# Patient Record
Sex: FEMALE | Race: BLACK OR AFRICAN AMERICAN | NOT HISPANIC OR LATINO | Employment: OTHER | URBAN - METROPOLITAN AREA
[De-identification: names, ages, dates, MRNs, and addresses within clinical notes are randomized per-mention and may not be internally consistent; named-entity substitution may affect disease eponyms.]

---

## 2023-01-28 PROBLEM — I25.10 CAD (CORONARY ARTERY DISEASE): Status: ACTIVE | Noted: 2023-01-28

## 2023-01-28 PROBLEM — I73.9 PERIPHERAL VASCULAR DISEASE (CMS-HCC): Status: ACTIVE | Noted: 2023-01-28

## 2023-01-28 PROBLEM — D64.9 ANEMIA: Status: ACTIVE | Noted: 2023-01-28

## 2023-01-28 PROBLEM — E55.9 VITAMIN D DEFICIENCY: Status: ACTIVE | Noted: 2023-01-28

## 2023-01-28 PROBLEM — M81.0 OSTEOPOROSIS: Status: ACTIVE | Noted: 2023-01-28

## 2023-01-28 PROBLEM — I42.0 ISCHEMIC DILATED CARDIOMYOPATHY (MULTI): Status: ACTIVE | Noted: 2023-01-28

## 2023-01-28 PROBLEM — E11.9 TYPE 2 DIABETES MELLITUS (MULTI): Status: ACTIVE | Noted: 2023-01-28

## 2023-01-28 PROBLEM — R79.89 ELEVATED FERRITIN LEVEL: Status: ACTIVE | Noted: 2023-01-28

## 2023-01-28 PROBLEM — N39.0 ACUTE UTI: Status: ACTIVE | Noted: 2023-01-28

## 2023-01-28 PROBLEM — R92.1 BREAST CALCIFICATION, RIGHT: Status: ACTIVE | Noted: 2023-01-28

## 2023-01-28 PROBLEM — M85.80 OSTEOPENIA: Status: ACTIVE | Noted: 2023-01-28

## 2023-01-28 PROBLEM — D56.1 BETA-THALASSEMIA (MULTI): Status: ACTIVE | Noted: 2023-01-28

## 2023-01-28 PROBLEM — I50.42 CHRONIC COMBINED SYSTOLIC AND DIASTOLIC HEART FAILURE, NYHA CLASS 2 (MULTI): Status: ACTIVE | Noted: 2023-01-28

## 2023-01-28 PROBLEM — E03.9 HYPOTHYROIDISM: Status: ACTIVE | Noted: 2023-01-28

## 2023-01-28 PROBLEM — E11.9 DIABETES MELLITUS (MULTI): Status: ACTIVE | Noted: 2023-01-28

## 2023-01-28 PROBLEM — I10 ESSENTIAL HYPERTENSION: Status: ACTIVE | Noted: 2023-01-28

## 2023-01-28 PROBLEM — R01.1 HEART MURMUR: Status: ACTIVE | Noted: 2023-01-28

## 2023-01-28 PROBLEM — I25.5 ISCHEMIC DILATED CARDIOMYOPATHY (MULTI): Status: ACTIVE | Noted: 2023-01-28

## 2023-01-28 PROBLEM — E78.5 HYPERLIPIDEMIA: Status: ACTIVE | Noted: 2023-01-28

## 2023-01-28 RX ORDER — LEVOTHYROXINE SODIUM 75 UG/1
1 TABLET ORAL EVERY OTHER DAY
COMMUNITY
Start: 2018-04-30 | End: 2023-03-29 | Stop reason: ALTCHOICE

## 2023-01-28 RX ORDER — ROSUVASTATIN CALCIUM 10 MG/1
10 TABLET, COATED ORAL NIGHTLY
COMMUNITY
End: 2023-07-31 | Stop reason: ALTCHOICE

## 2023-01-28 RX ORDER — SPIRONOLACTONE 25 MG/1
0.5 TABLET ORAL 2 TIMES DAILY
COMMUNITY
Start: 2021-11-02 | End: 2024-05-07 | Stop reason: SDUPTHER

## 2023-01-28 RX ORDER — LEVOTHYROXINE SODIUM 88 UG/1
1 TABLET ORAL EVERY OTHER DAY
COMMUNITY
Start: 2018-08-18 | End: 2023-10-19 | Stop reason: SDUPTHER

## 2023-01-28 RX ORDER — OMEGA-3S/DHA/EPA/FISH OIL 300-1000MG
CAPSULE ORAL SEE ADMIN INSTRUCTIONS
COMMUNITY
End: 2023-07-31 | Stop reason: ALTCHOICE

## 2023-01-28 RX ORDER — TORSEMIDE 20 MG/1
1 TABLET ORAL DAILY
COMMUNITY
Start: 2022-01-03 | End: 2023-10-30 | Stop reason: SINTOL

## 2023-01-28 RX ORDER — MULTIVIT-MIN/FA/LYCOPEN/LUTEIN .4-300-25
TABLET ORAL SEE ADMIN INSTRUCTIONS
COMMUNITY
Start: 2014-08-23 | End: 2023-11-02 | Stop reason: ALTCHOICE

## 2023-01-28 RX ORDER — GLUC/MSM/COLGN2/HYAL/ANTIARTH3 375-375-20
TABLET ORAL SEE ADMIN INSTRUCTIONS
COMMUNITY
End: 2023-07-31 | Stop reason: ALTCHOICE

## 2023-01-28 RX ORDER — CHOLECALCIFEROL (VITAMIN D3) 50 MCG
TABLET ORAL SEE ADMIN INSTRUCTIONS
COMMUNITY
Start: 2015-01-03 | End: 2024-06-03 | Stop reason: SDUPTHER

## 2023-01-28 RX ORDER — LISINOPRIL 40 MG/1
1 TABLET ORAL DAILY
COMMUNITY
Start: 2013-06-17 | End: 2023-07-31 | Stop reason: ALTCHOICE

## 2023-01-28 RX ORDER — METFORMIN HYDROCHLORIDE 500 MG/1
2 TABLET, EXTENDED RELEASE ORAL
COMMUNITY
Start: 2015-08-29 | End: 2024-06-03 | Stop reason: SDUPTHER

## 2023-01-28 RX ORDER — BLOOD SUGAR DIAGNOSTIC
STRIP MISCELLANEOUS SEE ADMIN INSTRUCTIONS
COMMUNITY
Start: 2022-04-26 | End: 2023-11-02 | Stop reason: SDUPTHER

## 2023-01-28 RX ORDER — METOPROLOL SUCCINATE 25 MG/1
1 TABLET, EXTENDED RELEASE ORAL DAILY
COMMUNITY
Start: 2013-06-06 | End: 2023-03-19

## 2023-01-28 RX ORDER — ASPIRIN 81 MG/1
1 TABLET ORAL DAILY
COMMUNITY
Start: 2016-12-28 | End: 2024-06-03 | Stop reason: SDUPTHER

## 2023-03-13 ENCOUNTER — APPOINTMENT (OUTPATIENT)
Dept: PRIMARY CARE | Facility: CLINIC | Age: 78
End: 2023-03-13
Payer: MEDICARE

## 2023-03-18 DIAGNOSIS — I25.10 CORONARY ARTERY DISEASE, UNSPECIFIED VESSEL OR LESION TYPE, UNSPECIFIED WHETHER ANGINA PRESENT, UNSPECIFIED WHETHER NATIVE OR TRANSPLANTED HEART: Primary | ICD-10-CM

## 2023-03-19 RX ORDER — METOPROLOL SUCCINATE 25 MG/1
TABLET, EXTENDED RELEASE ORAL
Qty: 90 TABLET | Refills: 0 | Status: SHIPPED | OUTPATIENT
Start: 2023-03-19 | End: 2024-05-07 | Stop reason: SDUPTHER

## 2023-03-29 ENCOUNTER — OFFICE VISIT (OUTPATIENT)
Dept: PRIMARY CARE | Facility: CLINIC | Age: 78
End: 2023-03-29
Payer: MEDICARE

## 2023-03-29 ENCOUNTER — LAB (OUTPATIENT)
Dept: LAB | Facility: LAB | Age: 78
End: 2023-03-29
Payer: MEDICARE

## 2023-03-29 VITALS
TEMPERATURE: 96.6 F | DIASTOLIC BLOOD PRESSURE: 62 MMHG | SYSTOLIC BLOOD PRESSURE: 126 MMHG | WEIGHT: 128 LBS | BODY MASS INDEX: 22.67 KG/M2

## 2023-03-29 DIAGNOSIS — E55.9 VITAMIN D DEFICIENCY: ICD-10-CM

## 2023-03-29 DIAGNOSIS — R01.1 HEART MURMUR: ICD-10-CM

## 2023-03-29 DIAGNOSIS — Z00.00 ROUTINE GENERAL MEDICAL EXAMINATION AT HEALTH CARE FACILITY: Primary | ICD-10-CM

## 2023-03-29 DIAGNOSIS — I10 ESSENTIAL HYPERTENSION: ICD-10-CM

## 2023-03-29 DIAGNOSIS — E11.9 TYPE 2 DIABETES MELLITUS WITHOUT COMPLICATION, WITHOUT LONG-TERM CURRENT USE OF INSULIN (MULTI): ICD-10-CM

## 2023-03-29 DIAGNOSIS — I73.9 PERIPHERAL VASCULAR DISEASE (CMS-HCC): ICD-10-CM

## 2023-03-29 DIAGNOSIS — I50.42 CHRONIC COMBINED SYSTOLIC AND DIASTOLIC HEART FAILURE, NYHA CLASS 2 (MULTI): ICD-10-CM

## 2023-03-29 LAB
ALANINE AMINOTRANSFERASE (SGPT) (U/L) IN SER/PLAS: 10 U/L (ref 7–45)
ALBUMIN (G/DL) IN SER/PLAS: 4.3 G/DL (ref 3.4–5)
ALBUMIN (MG/L) IN URINE: 7.1 MG/L
ALBUMIN/CREATININE (UG/MG) IN URINE: 13.1 UG/MG CRT (ref 0–30)
ALKALINE PHOSPHATASE (U/L) IN SER/PLAS: 73 U/L (ref 33–136)
ANION GAP IN SER/PLAS: 14 MMOL/L (ref 10–20)
ASPARTATE AMINOTRANSFERASE (SGOT) (U/L) IN SER/PLAS: 12 U/L (ref 9–39)
BILIRUBIN TOTAL (MG/DL) IN SER/PLAS: 0.9 MG/DL (ref 0–1.2)
CALCIUM (MG/DL) IN SER/PLAS: 9.7 MG/DL (ref 8.6–10.6)
CARBON DIOXIDE, TOTAL (MMOL/L) IN SER/PLAS: 28 MMOL/L (ref 21–32)
CHLORIDE (MMOL/L) IN SER/PLAS: 103 MMOL/L (ref 98–107)
CHOLESTEROL (MG/DL) IN SER/PLAS: 136 MG/DL (ref 0–199)
CHOLESTEROL IN HDL (MG/DL) IN SER/PLAS: 47.1 MG/DL
CHOLESTEROL/HDL RATIO: 2.9
CREATININE (MG/DL) IN SER/PLAS: 1.23 MG/DL (ref 0.5–1.05)
CREATININE (MG/DL) IN URINE: 54.2 MG/DL (ref 20–320)
ESTIMATED AVERAGE GLUCOSE FOR HBA1C: 217 MG/DL
GFR FEMALE: 45 ML/MIN/1.73M2
GLUCOSE (MG/DL) IN SER/PLAS: 348 MG/DL (ref 74–99)
HEMOGLOBIN A1C/HEMOGLOBIN TOTAL IN BLOOD: 9.2 %
LDL: 54 MG/DL (ref 0–99)
POTASSIUM (MMOL/L) IN SER/PLAS: 4.5 MMOL/L (ref 3.5–5.3)
PROTEIN TOTAL: 7 G/DL (ref 6.4–8.2)
SODIUM (MMOL/L) IN SER/PLAS: 140 MMOL/L (ref 136–145)
TRIGLYCERIDE (MG/DL) IN SER/PLAS: 173 MG/DL (ref 0–149)
UREA NITROGEN (MG/DL) IN SER/PLAS: 23 MG/DL (ref 6–23)
VLDL: 35 MG/DL (ref 0–40)

## 2023-03-29 PROCEDURE — 80061 LIPID PANEL: CPT

## 2023-03-29 PROCEDURE — 80053 COMPREHEN METABOLIC PANEL: CPT

## 2023-03-29 PROCEDURE — 82570 ASSAY OF URINE CREATININE: CPT

## 2023-03-29 PROCEDURE — G0439 PPPS, SUBSEQ VISIT: HCPCS | Performed by: INTERNAL MEDICINE

## 2023-03-29 PROCEDURE — 83036 HEMOGLOBIN GLYCOSYLATED A1C: CPT

## 2023-03-29 PROCEDURE — 3074F SYST BP LT 130 MM HG: CPT | Performed by: INTERNAL MEDICINE

## 2023-03-29 PROCEDURE — 36415 COLL VENOUS BLD VENIPUNCTURE: CPT

## 2023-03-29 PROCEDURE — 3078F DIAST BP <80 MM HG: CPT | Performed by: INTERNAL MEDICINE

## 2023-03-29 PROCEDURE — 99213 OFFICE O/P EST LOW 20 MIN: CPT | Performed by: INTERNAL MEDICINE

## 2023-03-29 PROCEDURE — 82043 UR ALBUMIN QUANTITATIVE: CPT

## 2023-03-29 RX ORDER — DEXTROSE 4 G
TABLET,CHEWABLE ORAL
Qty: 1 EACH | Refills: 0 | Status: SHIPPED | OUTPATIENT
Start: 2023-03-29 | End: 2023-07-31 | Stop reason: ALTCHOICE

## 2023-03-29 ASSESSMENT — PATIENT HEALTH QUESTIONNAIRE - PHQ9
2. FEELING DOWN, DEPRESSED OR HOPELESS: SEVERAL DAYS
SUM OF ALL RESPONSES TO PHQ9 QUESTIONS 1 AND 2: 2
1. LITTLE INTEREST OR PLEASURE IN DOING THINGS: SEVERAL DAYS

## 2023-03-29 ASSESSMENT — ENCOUNTER SYMPTOMS
CONSTITUTIONAL NEGATIVE: 1
RESPIRATORY NEGATIVE: 1
CARDIOVASCULAR NEGATIVE: 1
GASTROINTESTINAL NEGATIVE: 1

## 2023-03-29 NOTE — ASSESSMENT & PLAN NOTE
HTN.  Well controlled.  Continue with current medications.  Check BP at home daily.  Report to us if the numbers are higher than 130/80.

## 2023-03-29 NOTE — PROGRESS NOTES
Subjective   Patient ID: Dana Corrigan is a 78 y.o. female who presents for Follow-up and Annual Exam.  HPI  The patient is being seen for the subsequent annual wellness visit and follow up.  Past Medical, Surgical and Family History: reviewed and updated in chart.   Interval History: Patient has not been hospitalized previously.   Medications and Supplements: Review of all medications by a prescribing practitioner or clinical pharmacist (such as prescriptions, OTCs, herbal therapies and supplements) documented in the medical record.    No, the patient is not using opioids.   Health Risk Assessment:. Paper HRA completed by patient and scanned into chart.   Depression/Suicide Screening:  .   Done  No falls in the past year.  No recent hospitalizations.  Advance care planning completed.      Review of Systems   Constitutional: Negative.    Respiratory: Negative.     Cardiovascular: Negative.    Gastrointestinal: Negative.        Objective   Physical Exam  Constitutional:       Appearance: She is well-developed.   Cardiovascular:      Rate and Rhythm: Normal rate and regular rhythm.      Heart sounds: Murmur heard.   Pulmonary:      Effort: Pulmonary effort is normal.      Breath sounds: Normal breath sounds.   Abdominal:      General: Bowel sounds are normal.      Palpations: Abdomen is soft.         Assessment/Plan   Problem List Items Addressed This Visit          Circulatory    Chronic combined systolic and diastolic heart failure, NYHA class 2 (CMS/HCC)    Essential hypertension        HTN.  Well controlled.  Continue with current medications.  Check BP at home daily.  Report to us if the numbers are higher than 130/80.           Heart murmur    Peripheral vascular disease (CMS/HCC)       Endocrine/Metabolic    Vitamin D deficiency    Type 2 diabetes mellitus (CMS/HCC)        Diabetes Mellitus type II, under good  control.  1. Rx changes none  2. Education: Reviewed ‘ABCs’ of diabetes management (respective goals  in parentheses):  A1C (<7), blood pressure (<130/80), and cholesterol (LDL <100).  3. Compliance at present is estimated to be good. Efforts to improve compliance were discussed.  4. Follow up in 3 months             Relevant Medications    blood-glucose meter misc    Other Relevant Orders    Albumin , Urine Random    Comprehensive Metabolic Panel    Hemoglobin A1C    Lipid Panel     Other Visit Diagnoses       Routine general medical examination at health care facility    -  Primary                 Patience Yarbrough MD

## 2023-03-29 NOTE — ASSESSMENT & PLAN NOTE
Diabetes Mellitus type II, under good  control.  1. Rx changes none  2. Education: Reviewed ‘ABCs’ of diabetes management (respective goals in parentheses):  A1C (<7), blood pressure (<130/80), and cholesterol (LDL <100).  3. Compliance at present is estimated to be good. Efforts to improve compliance were discussed.  4. Follow up in 3 months

## 2023-06-13 DIAGNOSIS — R92.8 ABNORMAL MAMMOGRAM: ICD-10-CM

## 2023-07-07 ENCOUNTER — TELEPHONE (OUTPATIENT)
Dept: PRIMARY CARE | Facility: CLINIC | Age: 78
End: 2023-07-07
Payer: MEDICARE

## 2023-07-07 NOTE — TELEPHONE ENCOUNTER
----- Message from Patience Yarbrough MD sent at 7/7/2023  1:03 AM EDT -----  Repeat mammogram in 6 months

## 2023-07-11 LAB
ALBUMIN (G/DL) IN SER/PLAS: 4.8 G/DL (ref 3.4–5)
ANION GAP IN SER/PLAS: 16 MMOL/L (ref 10–20)
CALCIUM (MG/DL) IN SER/PLAS: 10.5 MG/DL (ref 8.6–10.6)
CARBON DIOXIDE, TOTAL (MMOL/L) IN SER/PLAS: 25 MMOL/L (ref 21–32)
CHLORIDE (MMOL/L) IN SER/PLAS: 100 MMOL/L (ref 98–107)
CHOLESTEROL (MG/DL) IN SER/PLAS: 164 MG/DL (ref 0–199)
CHOLESTEROL IN HDL (MG/DL) IN SER/PLAS: 49.5 MG/DL
CHOLESTEROL/HDL RATIO: 3.3
CREATININE (MG/DL) IN SER/PLAS: 1.56 MG/DL (ref 0.5–1.05)
ERYTHROCYTE DISTRIBUTION WIDTH (RATIO) BY AUTOMATED COUNT: 15.9 % (ref 11.5–14.5)
ERYTHROCYTE MEAN CORPUSCULAR HEMOGLOBIN CONCENTRATION (G/DL) BY AUTOMATED: 29.4 G/DL (ref 32–36)
ERYTHROCYTE MEAN CORPUSCULAR VOLUME (FL) BY AUTOMATED COUNT: 65 FL (ref 80–100)
ERYTHROCYTES (10*6/UL) IN BLOOD BY AUTOMATED COUNT: 5.38 X10E12/L (ref 4–5.2)
FERRITIN (UG/LL) IN SER/PLAS: 1046 UG/L (ref 8–150)
GFR FEMALE: 34 ML/MIN/1.73M2
GLUCOSE (MG/DL) IN SER/PLAS: 252 MG/DL (ref 74–99)
HEMATOCRIT (%) IN BLOOD BY AUTOMATED COUNT: 35 % (ref 36–46)
HEMOGLOBIN (G/DL) IN BLOOD: 10.3 G/DL (ref 12–16)
IRON (UG/DL) IN SER/PLAS: 93 UG/DL (ref 35–150)
IRON BINDING CAPACITY (UG/DL) IN SER/PLAS: 360 UG/DL (ref 240–445)
IRON SATURATION (%) IN SER/PLAS: 26 % (ref 25–45)
LDL: 78 MG/DL (ref 0–99)
LEUKOCYTES (10*3/UL) IN BLOOD BY AUTOMATED COUNT: 7.8 X10E9/L (ref 4.4–11.3)
NATRIURETIC PEPTIDE B (PG/ML) IN SER/PLAS: 29 PG/ML (ref 0–99)
NRBC (PER 100 WBCS) BY AUTOMATED COUNT: 0 /100 WBC (ref 0–0)
PHOSPHATE (MG/DL) IN SER/PLAS: 4.1 MG/DL (ref 2.5–4.9)
PLATELETS (10*3/UL) IN BLOOD AUTOMATED COUNT: 281 X10E9/L (ref 150–450)
POTASSIUM (MMOL/L) IN SER/PLAS: 5 MMOL/L (ref 3.5–5.3)
SODIUM (MMOL/L) IN SER/PLAS: 136 MMOL/L (ref 136–145)
THYROTROPIN (MIU/L) IN SER/PLAS BY DETECTION LIMIT <= 0.05 MIU/L: 0.33 MIU/L (ref 0.44–3.98)
THYROXINE (T4) FREE (NG/DL) IN SER/PLAS: 1.66 NG/DL (ref 0.78–1.48)
TRIGLYCERIDE (MG/DL) IN SER/PLAS: 183 MG/DL (ref 0–149)
UREA NITROGEN (MG/DL) IN SER/PLAS: 44 MG/DL (ref 6–23)
VLDL: 37 MG/DL (ref 0–40)

## 2023-07-31 ENCOUNTER — LAB (OUTPATIENT)
Dept: LAB | Facility: LAB | Age: 78
End: 2023-07-31
Payer: MEDICARE

## 2023-07-31 ENCOUNTER — OFFICE VISIT (OUTPATIENT)
Dept: PRIMARY CARE | Facility: CLINIC | Age: 78
End: 2023-07-31
Payer: MEDICARE

## 2023-07-31 VITALS
SYSTOLIC BLOOD PRESSURE: 127 MMHG | DIASTOLIC BLOOD PRESSURE: 72 MMHG | WEIGHT: 120 LBS | BODY MASS INDEX: 21.26 KG/M2 | HEART RATE: 79 BPM

## 2023-07-31 DIAGNOSIS — I10 ESSENTIAL HYPERTENSION: ICD-10-CM

## 2023-07-31 DIAGNOSIS — E11.9 TYPE 2 DIABETES MELLITUS WITHOUT COMPLICATION, WITHOUT LONG-TERM CURRENT USE OF INSULIN (MULTI): Primary | ICD-10-CM

## 2023-07-31 DIAGNOSIS — E11.9 TYPE 2 DIABETES MELLITUS WITHOUT COMPLICATION, WITHOUT LONG-TERM CURRENT USE OF INSULIN (MULTI): ICD-10-CM

## 2023-07-31 DIAGNOSIS — E03.9 HYPOTHYROIDISM, UNSPECIFIED TYPE: ICD-10-CM

## 2023-07-31 DIAGNOSIS — E78.5 HYPERLIPIDEMIA, UNSPECIFIED HYPERLIPIDEMIA TYPE: ICD-10-CM

## 2023-07-31 DIAGNOSIS — I50.42 CHRONIC COMBINED SYSTOLIC AND DIASTOLIC HEART FAILURE, NYHA CLASS 2 (MULTI): ICD-10-CM

## 2023-07-31 LAB
ESTIMATED AVERAGE GLUCOSE FOR HBA1C: 240 MG/DL
HEMOGLOBIN A1C/HEMOGLOBIN TOTAL IN BLOOD: 10 %
THYROTROPIN (MIU/L) IN SER/PLAS BY DETECTION LIMIT <= 0.05 MIU/L: 0.18 MIU/L (ref 0.44–3.98)
THYROXINE (T4) FREE (NG/DL) IN SER/PLAS: 1.74 NG/DL (ref 0.78–1.48)

## 2023-07-31 PROCEDURE — 84439 ASSAY OF FREE THYROXINE: CPT

## 2023-07-31 PROCEDURE — 99214 OFFICE O/P EST MOD 30 MIN: CPT | Performed by: INTERNAL MEDICINE

## 2023-07-31 PROCEDURE — 36415 COLL VENOUS BLD VENIPUNCTURE: CPT

## 2023-07-31 PROCEDURE — 1159F MED LIST DOCD IN RCRD: CPT | Performed by: INTERNAL MEDICINE

## 2023-07-31 PROCEDURE — 3074F SYST BP LT 130 MM HG: CPT | Performed by: INTERNAL MEDICINE

## 2023-07-31 PROCEDURE — 84443 ASSAY THYROID STIM HORMONE: CPT

## 2023-07-31 PROCEDURE — 83036 HEMOGLOBIN GLYCOSYLATED A1C: CPT

## 2023-07-31 PROCEDURE — 3078F DIAST BP <80 MM HG: CPT | Performed by: INTERNAL MEDICINE

## 2023-07-31 RX ORDER — SACUBITRIL AND VALSARTAN 24; 26 MG/1; MG/1
TABLET, FILM COATED ORAL 2 TIMES DAILY
COMMUNITY
End: 2023-11-02 | Stop reason: ALTCHOICE

## 2023-07-31 NOTE — ASSESSMENT & PLAN NOTE
She was on levothyroxine 75 mcg which was changed to 88 mcg daily and her last visit  Ordered a repeat TSH level today

## 2023-07-31 NOTE — PROGRESS NOTES
I reviewed with the resident the medical history and the resident’s findings on physical examination.  I discussed with the resident the patient’s diagnosis and concur with the treatment plan as documented in the resident note.     I saw and evaluated the patient. I personally obtained the key and critical portions of the history and physical exam or was physically present for key and critical portions performed by the trainee. I reviewed the trainee's documentation and discussed the patient with the trainee. I agree with the trainee's medical decision making, as documented on the trainee's note.     Patience Yarbrough MD

## 2023-07-31 NOTE — ASSESSMENT & PLAN NOTE
Her blood pressure in the office today was 127/72  Well-controlled  On metoprolol succinate 25 mg 1 tab daily  Torsemide 20 mg 1 tab daily

## 2023-07-31 NOTE — ASSESSMENT & PLAN NOTE
Takes Entresto 24-26 1 and half tab twice daily  Jardiance 10 mg daily  Metoprolol succinate 25 mg 1 tab daily  Spironolactone 25 mg 1 tab daily  Torsemide 20 mg 1 tab daily

## 2023-07-31 NOTE — ASSESSMENT & PLAN NOTE
Her most recent A1c on March this year showed increase A1c level from 8.4-9.2  - on Jardiance 10 mg daily  - metformin 500 mg 2 tab once daily  -Repeat hemoglobin A1c ordered

## 2023-07-31 NOTE — PROGRESS NOTES
Subjective   Patient ID: Dana Corrigan is a 78 y.o. female who presents for Follow-up.    HPI   Patient was complaining fullness in the bilateral ears for which she visited to otolaryngologist.  Her otolaryngologist mentioned that she has wax impaction and it should be cleared out.  That is why she is here today as well as also for her regular follow-up.  She denies any ER or hospital visit after her last appointment here.  According to her labs done in March this year showed a little improvement in her creatinine level but her hemoglobin A1c level is worsened from 8.4>9.2, lipid profile showed increased level of all the lipids.  She denied any other complaint today  Review of Systems   HENT:  Positive for hearing loss.    All other systems reviewed and are negative.      Objective   /72   Pulse 79   Wt 54.4 kg (120 lb)   BMI 21.26 kg/m²     Physical Exam  Constitutional:       Appearance: Normal appearance. She is normal weight.   Cardiovascular:      Rate and Rhythm: Normal rate and regular rhythm.      Pulses: Normal pulses.      Heart sounds: Normal heart sounds.      No friction rub. No gallop.   Pulmonary:      Effort: Pulmonary effort is normal.      Breath sounds: Normal breath sounds. No wheezing or rales.   Abdominal:      General: Abdomen is flat. Bowel sounds are normal. There is no distension.      Palpations: Abdomen is soft.      Tenderness: There is no abdominal tenderness. There is no guarding or rebound.   Neurological:      Mental Status: She is alert.         Assessment/Plan   Problem List Items Addressed This Visit          Endocrine/Metabolic    Diabetes mellitus (CMS/HCC) - Primary    Relevant Orders    Hemoglobin A1c    Hypothyroidism    Relevant Orders    Tsh With Reflex To Free T4 If Abnormal   # CHF(combined), NYHA Class 2   - Takes Entresto 24-26 1 and half tab twice daily  - Jardiance 10 mg daily  - Metoprolol succinate 25 mg 1 tab daily  - Spironolactone 25 mg 1 tab daily  -  Torsemide 20 mg 1 tab daily     # HTN  Her blood pressure in the office today was 127/72  Well-controlled  On metoprolol succinate 25 mg 1 tab daily  Torsemide 20 mg 1 tab daily     # HLD  On pravastatin 20 mg daily.    # DM2  Her most recent A1c on March this year showed increase A1c level from 8.4-9.2  - on Jardiance 10 mg daily  - metformin 500 mg 2 tab once daily  -Repeat hemoglobin A1c ordered    # Hypothyroidism  She was on levothyroxine 75 mcg which was changed to 88 mcg daily and her last visit  Last TSH (07/11/23): 0.33, free T4: 1.66  Ordered a repeat TSH level today.

## 2023-10-19 DIAGNOSIS — E03.8 HYPOTHYROIDISM DUE TO HASHIMOTO'S THYROIDITIS: Primary | ICD-10-CM

## 2023-10-19 DIAGNOSIS — E06.3 HYPOTHYROIDISM DUE TO HASHIMOTO'S THYROIDITIS: Primary | ICD-10-CM

## 2023-10-19 RX ORDER — LEVOTHYROXINE SODIUM 88 UG/1
88 TABLET ORAL EVERY OTHER DAY
Qty: 30 TABLET | Refills: 0 | Status: SHIPPED | OUTPATIENT
Start: 2023-10-19 | End: 2023-11-10 | Stop reason: SDUPTHER

## 2023-10-30 ENCOUNTER — LAB (OUTPATIENT)
Dept: LAB | Facility: LAB | Age: 78
End: 2023-10-30
Payer: MEDICARE

## 2023-10-30 ENCOUNTER — OFFICE VISIT (OUTPATIENT)
Dept: PRIMARY CARE | Facility: CLINIC | Age: 78
End: 2023-10-30
Payer: MEDICARE

## 2023-10-30 VITALS
SYSTOLIC BLOOD PRESSURE: 135 MMHG | BODY MASS INDEX: 21.08 KG/M2 | DIASTOLIC BLOOD PRESSURE: 70 MMHG | WEIGHT: 119 LBS | HEART RATE: 95 BPM

## 2023-10-30 DIAGNOSIS — I10 ESSENTIAL HYPERTENSION: ICD-10-CM

## 2023-10-30 DIAGNOSIS — E55.9 VITAMIN D DEFICIENCY: ICD-10-CM

## 2023-10-30 DIAGNOSIS — R01.1 HEART MURMUR: ICD-10-CM

## 2023-10-30 DIAGNOSIS — D64.9 ANEMIA, UNSPECIFIED TYPE: ICD-10-CM

## 2023-10-30 DIAGNOSIS — I50.42 CHRONIC COMBINED SYSTOLIC AND DIASTOLIC HEART FAILURE, NYHA CLASS 2 (MULTI): ICD-10-CM

## 2023-10-30 DIAGNOSIS — E11.9 TYPE 2 DIABETES MELLITUS WITHOUT COMPLICATION, WITHOUT LONG-TERM CURRENT USE OF INSULIN (MULTI): ICD-10-CM

## 2023-10-30 DIAGNOSIS — E78.5 HYPERLIPIDEMIA, UNSPECIFIED HYPERLIPIDEMIA TYPE: ICD-10-CM

## 2023-10-30 DIAGNOSIS — D56.1 BETA-THALASSEMIA (MULTI): ICD-10-CM

## 2023-10-30 DIAGNOSIS — N18.30 STAGE 3 CHRONIC KIDNEY DISEASE, UNSPECIFIED WHETHER STAGE 3A OR 3B CKD (MULTI): ICD-10-CM

## 2023-10-30 DIAGNOSIS — M81.0 OSTEOPOROSIS, UNSPECIFIED OSTEOPOROSIS TYPE, UNSPECIFIED PATHOLOGICAL FRACTURE PRESENCE: ICD-10-CM

## 2023-10-30 DIAGNOSIS — I25.10 CORONARY ARTERY DISEASE, UNSPECIFIED VESSEL OR LESION TYPE, UNSPECIFIED WHETHER ANGINA PRESENT, UNSPECIFIED WHETHER NATIVE OR TRANSPLANTED HEART: Primary | ICD-10-CM

## 2023-10-30 DIAGNOSIS — E03.9 HYPOTHYROIDISM, UNSPECIFIED TYPE: ICD-10-CM

## 2023-10-30 PROBLEM — N39.0 ACUTE UTI: Status: RESOLVED | Noted: 2023-01-28 | Resolved: 2023-10-30

## 2023-10-30 PROBLEM — Z00.00 HEALTHCARE MAINTENANCE: Status: ACTIVE | Noted: 2023-10-30

## 2023-10-30 LAB
ALBUMIN SERPL BCP-MCNC: 4.5 G/DL (ref 3.4–5)
ALP SERPL-CCNC: 77 U/L (ref 33–136)
ALT SERPL W P-5'-P-CCNC: 8 U/L (ref 7–45)
ANION GAP SERPL CALC-SCNC: 18 MMOL/L (ref 10–20)
AST SERPL W P-5'-P-CCNC: 13 U/L (ref 9–39)
BILIRUB SERPL-MCNC: 0.8 MG/DL (ref 0–1.2)
BUN SERPL-MCNC: 17 MG/DL (ref 6–23)
CALCIUM SERPL-MCNC: 10 MG/DL (ref 8.6–10.6)
CHLORIDE SERPL-SCNC: 107 MMOL/L (ref 98–107)
CHOLEST SERPL-MCNC: 221 MG/DL (ref 0–199)
CHOLESTEROL/HDL RATIO: 3.8
CO2 SERPL-SCNC: 23 MMOL/L (ref 21–32)
CREAT SERPL-MCNC: 1.22 MG/DL (ref 0.5–1.05)
CREAT UR-MCNC: 103.1 MG/DL (ref 20–320)
ERYTHROCYTE [DISTWIDTH] IN BLOOD BY AUTOMATED COUNT: 16.9 % (ref 11.5–14.5)
EST. AVERAGE GLUCOSE BLD GHB EST-MCNC: 206 MG/DL
GFR SERPL CREATININE-BSD FRML MDRD: 46 ML/MIN/1.73M*2
GLUCOSE SERPL-MCNC: 216 MG/DL (ref 74–99)
HBA1C MFR BLD: 8.8 %
HCT VFR BLD AUTO: 37.2 % (ref 36–46)
HDLC SERPL-MCNC: 58.1 MG/DL
HGB BLD-MCNC: 10.6 G/DL (ref 12–16)
LDLC SERPL CALC-MCNC: 128 MG/DL
MCH RBC QN AUTO: 19.1 PG (ref 26–34)
MCHC RBC AUTO-ENTMCNC: 28.5 G/DL (ref 32–36)
MCV RBC AUTO: 67 FL (ref 80–100)
MICROALBUMIN UR-MCNC: 15.6 MG/L
MICROALBUMIN/CREAT UR: 15.1 UG/MG CREAT
NON HDL CHOLESTEROL: 163 MG/DL (ref 0–149)
NRBC BLD-RTO: 0 /100 WBCS (ref 0–0)
PLATELET # BLD AUTO: 317 X10*3/UL (ref 150–450)
PMV BLD AUTO: 11.3 FL (ref 7.5–11.5)
POTASSIUM SERPL-SCNC: 4.7 MMOL/L (ref 3.5–5.3)
PROT SERPL-MCNC: 7.8 G/DL (ref 6.4–8.2)
RBC # BLD AUTO: 5.54 X10*6/UL (ref 4–5.2)
SODIUM SERPL-SCNC: 143 MMOL/L (ref 136–145)
TRIGL SERPL-MCNC: 177 MG/DL (ref 0–149)
TSH SERPL-ACNC: 3.37 MIU/L (ref 0.44–3.98)
VLDL: 35 MG/DL (ref 0–40)
WBC # BLD AUTO: 5.6 X10*3/UL (ref 4.4–11.3)

## 2023-10-30 PROCEDURE — 85027 COMPLETE CBC AUTOMATED: CPT

## 2023-10-30 PROCEDURE — 82043 UR ALBUMIN QUANTITATIVE: CPT

## 2023-10-30 PROCEDURE — 80061 LIPID PANEL: CPT

## 2023-10-30 PROCEDURE — 82570 ASSAY OF URINE CREATININE: CPT

## 2023-10-30 PROCEDURE — 80053 COMPREHEN METABOLIC PANEL: CPT

## 2023-10-30 PROCEDURE — 36415 COLL VENOUS BLD VENIPUNCTURE: CPT

## 2023-10-30 PROCEDURE — 83036 HEMOGLOBIN GLYCOSYLATED A1C: CPT

## 2023-10-30 PROCEDURE — 82652 VIT D 1 25-DIHYDROXY: CPT

## 2023-10-30 PROCEDURE — 84443 ASSAY THYROID STIM HORMONE: CPT

## 2023-10-30 PROCEDURE — 99214 OFFICE O/P EST MOD 30 MIN: CPT | Performed by: INTERNAL MEDICINE

## 2023-10-30 PROCEDURE — 1159F MED LIST DOCD IN RCRD: CPT | Performed by: INTERNAL MEDICINE

## 2023-10-30 PROCEDURE — 1126F AMNT PAIN NOTED NONE PRSNT: CPT | Performed by: INTERNAL MEDICINE

## 2023-10-30 PROCEDURE — 3075F SYST BP GE 130 - 139MM HG: CPT | Performed by: INTERNAL MEDICINE

## 2023-10-30 PROCEDURE — 1036F TOBACCO NON-USER: CPT | Performed by: INTERNAL MEDICINE

## 2023-10-30 PROCEDURE — 3078F DIAST BP <80 MM HG: CPT | Performed by: INTERNAL MEDICINE

## 2023-10-30 RX ORDER — DEXTROSE 4 G
1 TABLET,CHEWABLE ORAL 3 TIMES DAILY
Qty: 1 EACH | Refills: 0 | Status: SHIPPED | OUTPATIENT
Start: 2023-10-30

## 2023-10-30 RX ORDER — ALENDRONATE SODIUM 70 MG/1
70 TABLET ORAL
Qty: 4 TABLET | Refills: 2 | Status: SHIPPED | OUTPATIENT
Start: 2023-10-30 | End: 2023-11-22

## 2023-10-30 RX ORDER — BLOOD-GLUCOSE CONTROL, NORMAL
1 EACH MISCELLANEOUS 3 TIMES DAILY
Qty: 30 EACH | Refills: 2 | Status: SHIPPED | OUTPATIENT
Start: 2023-10-30 | End: 2024-06-03 | Stop reason: SDUPTHER

## 2023-10-30 RX ORDER — PRAVASTATIN SODIUM 20 MG/1
40 TABLET ORAL NIGHTLY
COMMUNITY
End: 2024-02-12 | Stop reason: SDUPTHER

## 2023-10-30 RX ORDER — BLOOD SUGAR DIAGNOSTIC
1 STRIP MISCELLANEOUS 3 TIMES DAILY
Qty: 30 STRIP | Refills: 2 | Status: SHIPPED | OUTPATIENT
Start: 2023-10-30 | End: 2024-04-06

## 2023-10-30 ASSESSMENT — ENCOUNTER SYMPTOMS
NUMBNESS: 0
DIZZINESS: 0
HEMATURIA: 0
DIFFICULTY URINATING: 0
FEVER: 0
LIGHT-HEADEDNESS: 0
ABDOMINAL PAIN: 0
FLANK PAIN: 0
VOMITING: 0
PALPITATIONS: 0
CHILLS: 0
SHORTNESS OF BREATH: 0
WHEEZING: 0
NAUSEA: 0
DYSURIA: 0
COLOR CHANGE: 0
CONFUSION: 0
FREQUENCY: 0
MYALGIAS: 0
ABDOMINAL DISTENTION: 0
COUGH: 0
DIARRHEA: 0

## 2023-10-30 ASSESSMENT — PATIENT HEALTH QUESTIONNAIRE - PHQ9
SUM OF ALL RESPONSES TO PHQ9 QUESTIONS 1 AND 2: 2
1. LITTLE INTEREST OR PLEASURE IN DOING THINGS: SEVERAL DAYS
2. FEELING DOWN, DEPRESSED OR HOPELESS: SEVERAL DAYS

## 2023-10-30 NOTE — PROGRESS NOTES
I saw and evaluated the patient. I personally obtained the key and critical portions of the history and physical exam or was physically present for key and critical portions performed by the resident/fellow. I reviewed the resident/fellow's documentation and discussed the patient with the resident/fellow. I agree with the resident/fellow's medical decision making as documented in the note.    Patience Yarbrough MD

## 2023-10-30 NOTE — PROGRESS NOTES
Subjective      History Of Present Illness:  Dana Corrigan is a 78 y.o. female with a PMH of HTN, HLD, DM2, hypothyroidism, osteoporosis, anemia, CAD, systolic-diastolic HF EF 30-35%, who presents to [facility] for follow up appointment. Patient reports that she was recently started on Jardiance and cholesterol rx she does not remember by her cardiologist that she is scheduled to see this week. ECHO and DEXA scans were also done this year per patient. Her daughter states that patient is eating sweets, which could be why her A1c has been increasing for the past 3 readings.     Past Medical History:  She has a past medical history of Body mass index (BMI) 19.9 or less, adult, Body mass index (BMI) 20.0-20.9, adult (04/15/2022), and Encounter for screening mammogram for malignant neoplasm of breast.    Past Surgical History:  She has a past surgical history that includes Total vaginal hysterectomy (06/16/2013) and Colonoscopy (02/22/2013).     Social History:  She reports that she has quit smoking. Her smoking use included cigarettes. She has never used smokeless tobacco. She reports that she does not currently use alcohol. She reports that she does not use drugs.    Family History:  Family History   Problem Relation Name Age of Onset    Hypertension Mother      Sarcoidosis Sister      Breast cancer Other      Colon cancer Other      Diabetes Other       Allergies:  Patient has no known allergies.    Home Medications:  (Not in a hospital admission)    Review Of Systems:  11-point ROS was performed and is negative except as noted below and in the HPI.     Review of Systems   Constitutional:  Negative for chills and fever.   Eyes:  Negative for visual disturbance.   Respiratory:  Negative for cough, shortness of breath and wheezing.    Cardiovascular:  Negative for chest pain, palpitations and leg swelling.   Gastrointestinal:  Negative for abdominal distention, abdominal pain, diarrhea, nausea and vomiting.    Genitourinary:  Negative for difficulty urinating, dysuria, flank pain, frequency and hematuria.   Musculoskeletal:  Negative for myalgias.   Skin:  Negative for color change.   Neurological:  Negative for dizziness, light-headedness and numbness.   Psychiatric/Behavioral:  Negative for confusion.         Objective      /70   Pulse 95   Wt 54 kg (119 lb)   BMI 21.08 kg/m²     Physical Exam  Constitutional:       General: She is not in acute distress.     Appearance: Normal appearance.   HENT:      Head: Normocephalic and atraumatic.      Mouth/Throat:      Mouth: Mucous membranes are moist.   Eyes:      Conjunctiva/sclera: Conjunctivae normal.      Pupils: Pupils are equal, round, and reactive to light.   Cardiovascular:      Rate and Rhythm: Normal rate and regular rhythm.      Heart sounds: Murmur heard.   Pulmonary:      Effort: No respiratory distress.      Breath sounds: Normal breath sounds. No wheezing or rhonchi.   Abdominal:      General: Bowel sounds are normal.      Palpations: Abdomen is soft.      Tenderness: There is no abdominal tenderness.   Musculoskeletal:         General: No swelling.      Cervical back: Neck supple.   Skin:     General: Skin is warm and dry.   Neurological:      General: No focal deficit present.      Mental Status: She is alert.       Assessment & Plan:     Assessment/Plan     Problem List Items Addressed This Visit       Anemia     - stable Thalassemia  - no recent major bleeding events          Relevant Orders    Vitamin D 1,25 Dihydroxy (for eval of hypercalcemia)    CBC    TSH with reflex to Free T4 if abnormal    CAD (coronary artery disease) - Primary     - follows with cardiologist this week  - ECHO done this year 2023         Chronic combined systolic and diastolic heart failure, NYHA class 2 (CMS/HCC)     - follows with cardiologist this week  - ECHO done this year 2023, last one showed EF 30-35%  - Continue with Jardiance, Entresto, Metoprolol XL, and  Aldactone  - Holding Torsemide due to elevated Cr         Essential hypertension     - /70 today  - Continue with Entresto, Metoprolol XL, and Aldactone  - Holding Torsemide due to elevated Cr         Heart murmur    Hyperlipidemia     - ASCVD 38%  - Was recently started on cholesterol medication by her cardiologist, will verify today         Hypothyroidism     - on Levothyroxine 88 mcg  - will check recheck today  - denies any symptoms         Osteoporosis     - last DEXA showed osteoporosis  - will start Alendronate 70 mg weekly         Relevant Medications    alendronate (Fosamax) 70 mg tablet    Vitamin D deficiency     - Continue with Vitamind D 2000 daily         Relevant Medications    alendronate (Fosamax) 70 mg tablet    Type 2 diabetes mellitus (CMS/HCC)     - last A1c 10%  - on Metformin 500 BID and recently started on Jardiance 10 mg   - will recheck A1c today         Relevant Orders    Albumin , Urine Random    Vitamin D 1,25 Dihydroxy (for eval of hypercalcemia)    Comprehensive Metabolic Panel    Hemoglobin A1C    Lipid Panel    Referral to Ophthalmology    Referral to Podiatry    CBC    TSH with reflex to Free T4 if abnormal       Dispo: Patient is scheduled to return to clinic in February 12th at 1000.    Armen Escobar DO, PGY-2  Internal Medicine     Disclaimer: Documentation completed with the information available at the time of input. The times in the chart may not be reflective of actual patient care times, interventions, or procedures. Documentation occurs after the physical care of the patient.

## 2023-10-30 NOTE — ASSESSMENT & PLAN NOTE
- follows with cardiologist this week  - ECHO done this year 2023, last one showed EF 30-35%  - Continue with Jardiance, Entresto, Metoprolol XL, and Aldactone  - Holding Torsemide due to elevated Cr

## 2023-10-30 NOTE — ASSESSMENT & PLAN NOTE
- ASCVD 38%  - Was recently started on cholesterol medication by her cardiologist, will verify today

## 2023-11-02 ENCOUNTER — OFFICE VISIT (OUTPATIENT)
Dept: CARDIOLOGY | Facility: CLINIC | Age: 78
End: 2023-11-02
Payer: MEDICARE

## 2023-11-02 ENCOUNTER — PROCEDURE VISIT (OUTPATIENT)
Dept: CARDIOLOGY | Facility: CLINIC | Age: 78
End: 2023-11-02
Payer: MEDICARE

## 2023-11-02 VITALS
BODY MASS INDEX: 21.09 KG/M2 | OXYGEN SATURATION: 97 % | SYSTOLIC BLOOD PRESSURE: 110 MMHG | DIASTOLIC BLOOD PRESSURE: 60 MMHG | WEIGHT: 119 LBS | HEART RATE: 86 BPM | HEIGHT: 63 IN

## 2023-11-02 DIAGNOSIS — I50.42 CHRONIC COMBINED SYSTOLIC AND DIASTOLIC HEART FAILURE, NYHA CLASS 2 (MULTI): ICD-10-CM

## 2023-11-02 DIAGNOSIS — I50.20 HFREF (HEART FAILURE WITH REDUCED EJECTION FRACTION) (MULTI): Primary | ICD-10-CM

## 2023-11-02 LAB — 1,25(OH)2D3 SERPL-MCNC: 35.5 PG/ML (ref 19.9–79.3)

## 2023-11-02 PROCEDURE — 1036F TOBACCO NON-USER: CPT | Performed by: STUDENT IN AN ORGANIZED HEALTH CARE EDUCATION/TRAINING PROGRAM

## 2023-11-02 PROCEDURE — 93005 ELECTROCARDIOGRAM TRACING: CPT

## 2023-11-02 PROCEDURE — 3074F SYST BP LT 130 MM HG: CPT | Performed by: STUDENT IN AN ORGANIZED HEALTH CARE EDUCATION/TRAINING PROGRAM

## 2023-11-02 PROCEDURE — 93010 ELECTROCARDIOGRAM REPORT: CPT | Performed by: STUDENT IN AN ORGANIZED HEALTH CARE EDUCATION/TRAINING PROGRAM

## 2023-11-02 PROCEDURE — 1126F AMNT PAIN NOTED NONE PRSNT: CPT | Performed by: STUDENT IN AN ORGANIZED HEALTH CARE EDUCATION/TRAINING PROGRAM

## 2023-11-02 PROCEDURE — 99215 OFFICE O/P EST HI 40 MIN: CPT | Performed by: STUDENT IN AN ORGANIZED HEALTH CARE EDUCATION/TRAINING PROGRAM

## 2023-11-02 PROCEDURE — 3078F DIAST BP <80 MM HG: CPT | Performed by: STUDENT IN AN ORGANIZED HEALTH CARE EDUCATION/TRAINING PROGRAM

## 2023-11-02 PROCEDURE — 1159F MED LIST DOCD IN RCRD: CPT | Performed by: STUDENT IN AN ORGANIZED HEALTH CARE EDUCATION/TRAINING PROGRAM

## 2023-11-02 RX ORDER — FLUCONAZOLE 150 MG/1
150 TABLET ORAL ONCE
Qty: 1 TABLET | Refills: 0 | Status: SHIPPED | OUTPATIENT
Start: 2023-11-02 | End: 2023-11-02

## 2023-11-02 ASSESSMENT — ENCOUNTER SYMPTOMS
LOSS OF SENSATION IN FEET: 0
DEPRESSION: 0
OCCASIONAL FEELINGS OF UNSTEADINESS: 0

## 2023-11-02 ASSESSMENT — PAIN SCALES - GENERAL: PAINLEVEL: 0-NO PAIN

## 2023-11-02 NOTE — PATIENT INSTRUCTIONS
Thank you for coming in today. If you have any questions or concerns, you may call the Heart Failure Office at [phone] option [phone], or [phone].  You may also contact our heart failure nursing team via email on [email or web address].    For quicker results set-up your  [employer] account to receive results and other correspondence directly to your phone.    Please bring all your pills/medications to your Cardiology appointments.    **  -We will renew your heart medications today    - Please make the following medication changes:  1. Increase  ENTRESTO  to 49/51 mg twice a day     2. USE Fluconazole 150 mg by mouth ONCE    - Please have the following tests done:  1.Blood tests just before your next visit (RFP, BNP, CBC, iron, TIBC, ferritin)    - Please make an appointment to be seen in  6 months

## 2023-11-02 NOTE — PROGRESS NOTES
"   Chief Complaint    Ambulatory heart failure care      History of Present Illness  HISTORY OF PRESENT ILLNESS:  Former cardiologist: Dr. Jay Carolina  Accompanied by: Daughter, Lisset     78 -year-old woman retired  with past medical history of HFrEF (LVEF 30 to 35% as of 10/29/2021 TTE) secondary to ischemic cardiomyopathy, severe multivessel CAD that is currently medically managed, non-insulin-dependent diabetes mellitus, hypothyroidism, hypertension, and ex smoker quit 10/2021, who presents for follow-up regarding HFrEF management.  She completed cardiac MRI 6/2023 which demonstrated LVEF 52%, with left ventricular hypertrophy, no evidence of scar, infiltration, or thrombus. Moreover there was inferior apical hypokinesis.  At last visit ACE inhibitor was discontinued and she was initiated on sacubitril/valsartan, spironolactone dosing was altered to 25 mg once daily, previously 12.5 mg twice daily.  Cardiac MRI with regadenoson protocol 7/2023 showed \".. LVEF = 70%.  Nearly transmural subendocardial delayed enhancement involving the mid to apical inferior wall (RCA territory) with associated perfusion defect on stress imaging that indicates superimposed ischemia. Given the transmural scar, this myocardium is unlikely to be viable for revascularization. There is associated mild to moderate hypokinesis of the inferior wall that correlates to  this area. RVEF = 76%. \"     There is no history of chest pain, SOB at rest, SOBOE, orthopnoea, PND,  bendopnoea, syncope, pre syncope, palpitations, or ankle swelling.  More  energy with ARNi and was able to climb the stairs yesterday with no symptoms!     She is adherent with all heart failure medications.    Her last heart failure hospitalization 10/2021    Family history:  Mother- ? HF, ?CVA    Surgical history:  Bladder surgery    Obstetric history:  G2   No cardiac complications of surgery    Social history:  -Former smoker (1/2 PPD x 50 years, quit 10/2021)  - " No alcohol use.   - No illicit drug use.     Investigations:    10/29/2021 TTE: LVEF 30 to 35%, normal LV cavity size, restrictive diastolic filling pattern, elevated left atrial pressure and LVEDP, mild to moderate MR, global hypokinesis.    11/2/2021 LHC: 80% proximal to mid OM1, 60% proximal RCA, 90% mid RCA, 80% distal RCA in small caliber RCA vessel. No intervention performed. LVEDP 10 mmHg.    11/2/2021 RHC: RA 5, RV 37/8, PA 37/14/21, PCWP 7, TPG 14, Corey cardiac output/index 4.0/2.6, PVR 3.5 Wood units, SVR 1440 dynes.    1/19/2023 ECG: Normal sinus rhythm, normal axis, inferior Q waves, PVC, narrow QRS complex, heart rate 82 bpm.    ECG 11/2/2023 : NSR, QRS ~ 70 ms  The electronic medical record has been reviewed by me for salient history. All cardiovascular imaging and testing available in the electronic medical record, and [employer] has been reviewed.      Active Problems  Problems    · Acute UTI (599.0) (N39.0)   · Anemia (285.9) (D64.9)   · Added by Problem List Migration; 2013-2-19; Moved to [facility] Nov 23 2013  9:02PM   · Beta-thalassemia (282.44) (D56.1)   · BMI 21.0-21.9, adult (V85.1) (Z68.21)   · Breast calcification, right (793.89) (R92.1)   · Breast cancer screening (V76.10) (Z12.39)   · CAD (coronary artery disease) (414.00) (I25.10)   · Chronic combined systolic and diastolic heart failure, NYHA class 2 (428.42) (I50.42)   · Chronic combined systolic and diastolic heart failure, NYHA class 2   · Colon cancer screening (V76.51) (Z12.11)   · Diabetes mellitus (250.00) (E11.9)   · Elevated ferritin level (790.6) (R79.89)   · Encounter for smoking cessation counseling (V65.42,305.1) (Z71.6)   · Essential hypertension (401.9) (I10)   · Hearing impairment (389.9) (H91.90)   · Heart murmur (785.2) (R01.1)   · Hyperlipidemia (272.4) (E78.5)   · Hypothyroidism (244.9) (E03.9)   · Ischemic dilated cardiomyopathy due to coronary artery disease (414.8,414.00)  (I25.5,I25.10)   · Osteopenia (733.90)  (M85.80)   · Osteoporosis (733.00) (M81.0)   · Peripheral vascular disease (443.9) (I73.9)   · Screening for malignant neoplasm of the rectum (V76.41) (Z12.12)   · Type 2 diabetes mellitus, uncontrolled (250.02)   · Vitamin D deficiency (268.9) (E55.9)    Surgical History  Problems    · History of Complete Colonoscopy   · History of Vaginal Hysterectomy    Past Medical History  Problems    · History of BMI 20.0-20.9, adult (V85.1) (Z68.20)   · Resolved Date: 21 Nov 2022   · History of BMI less than 19,adult (V85.0) (Z68.1)   · Resolved Date: 15 Apr 2022   · History of Visit for screening mammogram (V76.12) (Z12.31)    Current Meds  Medication Documentation Review Audit       Reviewed by Maia Smith RN (Registered Nurse) on 11/02/23 at 1114      Medication Order Taking? Sig Documenting Provider Last Dose Status   alendronate (Fosamax) 70 mg tablet [id number] No Take 1 tablet (70 mg) by mouth every 7 days. Take in the morning with a full glass of water, on an empty stomach, and do not take anything else by mouth or lie down for the next 30 min.   Patient not taking: Reported on 11/2/2023    Armen Escobar, DO Not Taking Active   aspirin 81 mg EC tablet [id number] Yes Take 1 tablet (81 mg) by mouth once daily. Historical Provider, MD Taking Active   blood-glucose meter (OneTouch Ultra2 Meter) misc [id number] Yes 1 Units 3 times a day. Armen Escobar, DO Taking Active   cholecalciferol (Vitamin D-3) 50 MCG (2000 UT) tablet [id number] Yes See administration instructions. Historical Provider, MD Taking Active   empagliflozin (Jardiance) 10 mg [id number] Yes Take 1 tablet (10 mg) by mouth once daily. Historical Provider, MD Taking Active   Entresto 24-26 mg tablet [id number] Yes Take by mouth 2 times a day. Take 1-1/2 tablets (150 mg) twice daily Historical Provider, MD Taking Active   lancets (OneTouch UltraSoft 2 Lancet) 30 gauge misc [id number] Yes 1 Lancet 3 times a day. Armen Escobar, DO Taking Active   levothyroxine  (Synthroid, Levoxyl) 88 mcg tablet [id number] Yes Take 1 tablet (88 mcg) by mouth every other day. Patience Yarbrough MD Taking Active   metFORMIN XR (Glucophage-XR) 500 mg 24 hr tablet [id number] Yes Take 2 tablets (1,000 mg) by mouth once daily in the evening. Take with meals. Historical MD Leila Taking Active   metoprolol succinate XL (Toprol-XL) 25 mg 24 hr tablet [id number] Yes TAKE 1 TABLET BY MOUTH EVERY DAY Patience Yarbrough MD Taking Active   multivitamin-iron-minerals-folic acid (Centrum Silver) 0.4 mg-300 mcg- 250 mcg tablet [id number]  See administration instructions. Historical Provider, MD  Active   Discontinued 23 1113   OneTouch Ultra Test strip [id number] Yes 1 strip 3 times a day. Armen Escobar DO Taking Active   pravastatin (Pravachol) 20 mg tablet [id number] Yes Take 1 tablet (20 mg) by mouth once daily at bedtime. Historical Provider, MD Taking Active   spironolactone (Aldactone) 25 mg tablet [id number] Yes Take 0.5 tablets (12.5 mg) by mouth twice a day. Historical Provider, MD Taking Differently Active     Discontinued 10/30/23 1018                   Allergies  Medication    · No Known Drug Allergies  Recorded By: Karrie Stanley; 2013 12:31:18 PM    Family History  Mother    · Family history of hypertension (V17.49) (Z82.49)  Father    · Family history of Father  At Age 26  gunshot  Sister    · Family history of Sarcoidosis   · Family history of Sister  At Age 35  Family History    · Denied: Family history of Breast Cancer   · Denied: Family history of Colon Cancer   · Family history of Diabetes Mellitus (V18.0)  father's side of family    Social History  Problems    · Activities of daily living (ADL's), independent   · Current every day smoker (305.1) (F17.200)   · Does not use illicit drugs (V49.89) (Z78.9)   · Does not use tobacco (V49.89) (Z78.9)   · Drinks wine (V49.89) (Z78.9)   · Retired from employment    Review of Systems    Constitutional: feeling tired,  but not feeling poorly, no fever and no chills.   Eyes: no eyesight problems.   ENT: hearing loss.   Cardiovascular: as noted in HPI.   Respiratory: as noted in HPI.   Gastrointestinal: no blood in stools.   Genitourinary: no hematuria.   Musculoskeletal: no limb swelling and no difficulty walking.   Skin: no skin lesions.   Neurological: no frequent falls, no dizziness and no fainting.   Psychiatric: depression.   Endocrine: thyroid disorder and diabetes mellitus.   Hematologic/Lymphatic: does not bleed easily and does not bruise easily.      Vitals    Vitals:    11/02/23 1110   BP: 110/60   Pulse: 86   SpO2: 97%   Body mass index is 21.08 kg/m².  Vitals:    11/02/23 1110   Weight: 54 kg (119 lb)         Recorded: 11Jul2023 11:59AM   Heart Rate 102   Systolic 102, Sitting   Diastolic 52, Sitting   Height 5 ft 3 in   Weight 117 lb    BMI Calculated 20.73 kg/m2   BSA Calculated 1.54   Tobacco Use b) No   Falls Screening (Age 18+) a) No falls within the last year   O2 Saturation 96, RA     Physical Exam  On examination:    Very pleasant thin elderly -American woman in no apparent CP or painful distress  Well groomed   Neck: No JVD or HJR  CVS: HS 1,2. gd 2 ESM at URSE, GD 2 PSM at LLSE  Resp: CTA bilaterally. Percussion note resonant  Abdomen: Flat, SNT, BS wnl  Extremities: No pedal oedema  Skin: warm and dry  CNS: AO x 4, hearing impairment       Results/Data  MRI Cardiac w/wo contrast for Morph/Funct and Valve Dz 01Jun2023 11:12AM Jay Garcia   ORDER REVISED TO A TH MRI CARDIAC W/WO CONTRAST FOR MORPH/FUNCT AND VALVE DZ BY RADIOLOGIST; Original Order Number: [id number]     Test Name Result Flag Reference   MRI Cardiac w/wo contrast for Morph/Funct and Valve Dz (Report)     FINAL REPORT  Interpreted by: CHANCE CHAVEZ CHAPMAN, MD   06/12/23 09:06  Addendum [facility]                              CMR Report      MRN:          [id number]                  Name:        SOPHIA CHASE                  :         1945                  Scan Date:  2023 09:04:35                    Electronically signed by Poncho Vincent  09:06:23     GENERAL INFORMATION   =====================================================================  =====================================     WEIGHT: 125.66 lbs  (57.00 kgs)  BASELINE HR: 81 BPM  SCAN LOCATION: [facility]  REFERRING PHYSICIAN: RUDDY VILLALTA  ATTENDING PHYSICIAN: RUDDY VILLALTA  ACCESSION NUMBER: [id number]  ICD10 CODES: I50.42, [ , ]I25.10  PATIENT HISTORY: DR RUDDY VILLALTA     SUMMARY   =====================================================================  =====================================     Low normal left ventricular function with mild concentric   hypertrophy.Focal inferoapical hypokinesia with  inferoapical and inferolateral thinning and corresponding resting   perfusion defect .Finding suggest prior  subendocardial ischemia/infarction.     LEFT VENTRICLE: There is mild LV hypertrophy. There is concentric   LVH. LV cavity size is mildly hypoplastic. LV systolic  function is regionally impaired.There is inferoapical hypokinesia.   There is no LV mass/thrombus.  Quantitative LVEF 52 %.     VIABILITY: No scar or infiltrative disease.     RIGHT VENTRICLE: The right ventricle is normal.     LV/RV SEPTUM: The LV/RV septum is normal.     LA/RA SEPTUM: The interatrial septum is hypermobile.     LEFT ATRIUM: The left atrium is normal.     RIGHT ATRIUM: The right atrium is normal.     PLEURAL EFFUSION: There is no pleural effusion.     AORTIC VALVE: The aortic valve is normal.     MITRAL VALVE: There is mild mitral regurgitation.     AORTIC ROOT: The aortic root is normal.        CORE EXAM   =====================================================================  =====================================     MEASUREMENTS   ---------------------------------------------------------------------  -------------------    VOLUMETRIC ANALYSIS      ----------------------------------------------  .----------------------------------------------------.  .   .     . LV  . Reference . RV . Reference .  +-----+----------+------+-----------+----+-----------+  . EDV . ml    . 72.7 . () .  .      .  .   . ml/m\S\2  .   .      .  .      .  . ESV . ml    . 34.9 . (18-55) .  .      .  .   . ml/m\S\2  .   .      .  .      .  . CO . L/min  . 3.06 .      .  .      .  .   . L/min/m\S\2 .   .      .  .      .  .   . g/m\S\2   .   .      .  .      .  . SV . ml    .  38 . () .  .      .  .   . ml/m\S\2  .   .      .  .      .  . EF . %    .  52 . (60-78) .  .      .  '-----+----------+------+-----------+----+-----------'        17 SEGMENT   ---------------------------------------------------------------------  -------------------  .---------------------------------------------------------------------  --------------------.  . Segments      . Wall Motion . Hyperenhancement . Stress   Perfusion . Interpretation .  +--------------------+-------------+------------------+---------------  ---+----------------+  . Base Anterior   .       .         .           .        .  . Base Anteroseptal .       .         .           .        .  . Base Inferoseptal .       .         .           .        .  . Base Inferior   .       .         .           .        .  . Base Inferolateral .       .         .           .        .  . Base Anterolateral .       .         .           .        .  . Mid Anterior    .       .         .           .        .  . Mid Anteroseptal  .       .         .           .        .  . Mid Inferoseptal  .       .         .           .        .  . Mid Inferior    .       .         .           .        .  . Mid Inferolateral .       .         .           .        .  . Mid Anterolateral .       .         .           .        .  . Apical Anterior  .       .         .           .        .  . Apical Septal   .       .         .           .        .  . Apical Inferior   . Severe Hypo .         .           .        .  . Apical Lateral   .       .         .           .        .  . [place]        .       .         .           .        .  +--------------------+-------------+------------------+---------------  ---+----------------+  . RV Segments    . Wall Motion . Hyperenhancement . Stress   Perfusion . Interpretation .  +--------------------+-------------+------------------+---------------  ---+----------------+  . RV Basal Anterior .       .         .           .        .  . RV Basal Inferior .       .         .           .        .  . RV Mid       .       .         .           .        .  . RV Apical     .       .         .           .        .  '--------------------+-------------+------------------+---------------  ---+----------------'        SCAN INFO   =====================================================================  =====================================     GENERAL   ---------------------------------------------------------------------  -------------------    SCANNER     ----------------------------------------------      : [employer]      MODEL: Ingenia       CONTRAST AGENT     ----------------------------------------------      GD CONCENTRATION: 0.5 M        Report generated by [employer], a product of [employer]  [facility]                              CMR Report      MRN:          [id number]                  Name:       SOPHIA CHASE                  :         1945                  Scan Date:  2023 09:04:35                    Electronically signed by Poncho Vincent  08:50:09     GENERAL INFORMATION   =====================================================================  =====================================     WEIGHT: 125.66 lbs  (57.00 kgs)  BASELINE HR: 81 BPM  SCAN LOCATION: [facility]  REFERRING PHYSICIAN: RUDDY VILLALTA  ATTENDING PHYSICIAN: RUDDY VILLALTA  ACCESSION  NUMBER: [id number]  ICD10 CODES: I50.42, [ , ]I25.10  PATIENT HISTORY: DR RUDDY VILLALTA     SUMMARY   =====================================================================  =====================================     Low normal left ventricular function with mild concentric   hypertrophy.Focal inferoapical hypokinesia .     LEFT VENTRICLE: There is mild LV hypertrophy. There is concentric   LVH. LV cavity size is mildly hypoplastic. LV systolic  function is regionally impaired.There is inferoapical hypokinesia.   There is no LV mass/thrombus.  Quantitative LVEF 52 %.     VIABILITY: No scar or infiltrative disease.     RIGHT VENTRICLE: The right ventricle is normal.     LV/RV SEPTUM: The LV/RV septum is normal.     LA/RA SEPTUM: The interatrial septum is hypermobile.     LEFT ATRIUM: The left atrium is normal.     RIGHT ATRIUM: The right atrium is normal.     PLEURAL EFFUSION: There is no pleural effusion.     AORTIC VALVE: The aortic valve is normal.     MITRAL VALVE: There is mild mitral regurgitation.     AORTIC ROOT: The aortic root is normal.        CORE EXAM   =====================================================================  =====================================     MEASUREMENTS   ---------------------------------------------------------------------  -------------------    VOLUMETRIC ANALYSIS     ----------------------------------------------  .----------------------------------------------------.  .   .     . LV  . Reference . RV . Reference .  +-----+----------+------+-----------+----+-----------+  . EDV . ml    . 72.7 . () .  .      .  .   . ml/m\S\2  .   .      .  .      .  . ESV . ml    . 34.9 . (18-55) .  .      .  .   . ml/m\S\2  .   .      .  .      .  . CO . L/min  . 3.06 .      .  .      .  .   . L/min/m\S\2 .   .      .  .      .  .   . g/m\S\2   .   .      .  .      .  . SV . ml    .  38 . () .  .      .  .   . ml/m\S\2  .   .      .  .      .  . EF . %    .  52 . (60-78) .  .       .  '-----+----------+------+-----------+----+-----------'        17 SEGMENT   ---------------------------------------------------------------------  -------------------  .---------------------------------------------------------------------  --------------------.  . Segments      . Wall Motion . Hyperenhancement . Stress   Perfusion . Interpretation .  +--------------------+-------------+------------------+---------------  ---+----------------+  . Base Anterior   .       .         .           .        .  . Base Anteroseptal .       .         .           .        .  . Base Inferoseptal .       .         .           .        .  . Base Inferior   .       .         .           .        .  . Base Inferolateral .       .         .           .        .  . Base Anterolateral .       .         .           .        .  . Mid Anterior    .       .         .           .        .  . Mid Anteroseptal  .       .         .           .        .  . Mid Inferoseptal  .       .         .           .        .  . Mid Inferior    .       .         .           .        .  . Mid Inferolateral .       .         .           .        .  . Mid Anterolateral .       .         .           .        .  . Apical Anterior  .       .         .           .        .  . Apical Septal   .       .         .           .        .  . Apical Inferior  . Severe Hypo .         .           .        .  . Apical Lateral   .       .         .           .        .  . [place]        .       .         .           .        .  +--------------------+-------------+------------------+---------------  ---+----------------+  . RV Segments    . Wall Motion . Hyperenhancement . Stress   Perfusion . Interpretation .  +--------------------+-------------+------------------+---------------  ---+----------------+  . RV Basal Anterior .       .         .           .        .  . RV Basal Inferior .       .         .           .        .  . RV Mid       .       .         .           .         .  . RV Apical     .       .         .           .        .  '--------------------+-------------+------------------+---------------  ---+----------------'        SCAN INFO   =====================================================================  =====================================     GENERAL   ---------------------------------------------------------------------  -------------------    SCANNER     ----------------------------------------------      : [employer]      MODEL: Ingenia       CONTRAST AGENT     ----------------------------------------------      GD CONCENTRATION: 0.5 M        Report generated by [employer], a product of [employer]    Electronically signed by: CHANCE CHAVEZ  06/12/23 09:06     Impressions    IMP:     78 -year-old retired  with past medical history of HFrEF (LVEF 30 to 35% as of 10/29/2021 TTE) secondary to ischemic cardiomyopathy, severe multivessel CAD that is currently medically managed, non-insulin-dependent diabetes mellitus, hypothyroidism, hypertension, and ex smoker quit 10/2021, who presents for follow-up regarding HFrEF management.  She completed cardiac MRI 6/2023 which demonstrated LVEF 52%, with left ventricular hypertrophy, no evidence of scar, infiltration, or thrombus. Moreover there was inferior apical hypokinesis.  HF GDMT being titrated    ASSESSMENT / PLAN:    #Chronic HFrEF, secondary to ischemic cardiomyopathy: NYHA class I-II symptoms, stage C, profile A (euvolemic) on exam today. Has severe mid and distal RCA lesions on recent left heart cath. No viability on cMRI 7/2023, asymptomatic    -Redo order for cardiac MRI, to assess for viability  -Increase sacubitril/valsartan to 49/51 mg bid   -Continue metoprolol succinate 25 mg p.o. once daily.  -Continue spironolactone 25 mg once daily  -Continue torsemide 20 mg p.o. once daily.  -Continue empagliflozin 10 mg once daily. Vaginal candidiasis x 1 and will give Fluconazole  today x 1 dose    #CAD  Severe mid and distal RCA lesions on 11/22/2021 left heart cath, not intervened upon due to small caliber vessel and unclear benefit without viability study. For that reason, intervention was not performed during hospital admission. Asymptomatic for angina/chest pain    -Continue aspirin 81 mg p.o. once daily.  -Continue  Pravastatin, did not tolerate Atorva    #Non-insulin-dependent diabetes mellitus:   -On metformin and empagliflozin    #Hypothyroidism.   -On levothyroxine    This note was transcribed using the Dragon Dictation system. There may be grammatical, punctuation, or verbiage errors that can occur with voice recognition programs.

## 2023-11-07 NOTE — ASSESSMENT & PLAN NOTE
- last A1c 10%  - on Metformin 500 BID and recently started on Jardiance 10 mg   - will recheck A1c today   Alert and oriented to person, place and time

## 2023-11-10 DIAGNOSIS — E06.3 HYPOTHYROIDISM DUE TO HASHIMOTO'S THYROIDITIS: ICD-10-CM

## 2023-11-10 DIAGNOSIS — E03.8 HYPOTHYROIDISM DUE TO HASHIMOTO'S THYROIDITIS: ICD-10-CM

## 2023-11-10 RX ORDER — LEVOTHYROXINE SODIUM 88 UG/1
88 TABLET ORAL EVERY OTHER DAY
Qty: 45 TABLET | Refills: 0 | Status: SHIPPED | OUTPATIENT
Start: 2023-11-10 | End: 2023-12-04

## 2023-11-14 LAB
ATRIAL RATE: 86 BPM
P AXIS: 74 DEGREES
P OFFSET: 181 MS
P ONSET: 130 MS
PR INTERVAL: 180 MS
Q ONSET: 220 MS
QRS COUNT: 14 BEATS
QRS DURATION: 66 MS
QT INTERVAL: 356 MS
QTC CALCULATION(BAZETT): 426 MS
QTC FREDERICIA: 401 MS
R AXIS: 70 DEGREES
T AXIS: 85 DEGREES
T OFFSET: 398 MS
VENTRICULAR RATE: 86 BPM

## 2023-11-22 DIAGNOSIS — M81.0 OSTEOPOROSIS, UNSPECIFIED OSTEOPOROSIS TYPE, UNSPECIFIED PATHOLOGICAL FRACTURE PRESENCE: ICD-10-CM

## 2023-11-22 DIAGNOSIS — E55.9 VITAMIN D DEFICIENCY: ICD-10-CM

## 2023-11-22 RX ORDER — ALENDRONATE SODIUM 70 MG/1
70 TABLET ORAL
Qty: 12 TABLET | Refills: 1 | Status: SHIPPED | OUTPATIENT
Start: 2023-11-22 | End: 2024-02-26

## 2023-11-30 PROBLEM — N18.30 STAGE 3 CHRONIC KIDNEY DISEASE, UNSPECIFIED WHETHER STAGE 3A OR 3B CKD (MULTI): Status: ACTIVE | Noted: 2023-11-30

## 2023-12-03 DIAGNOSIS — E06.3 HYPOTHYROIDISM DUE TO HASHIMOTO'S THYROIDITIS: ICD-10-CM

## 2023-12-03 DIAGNOSIS — E03.8 HYPOTHYROIDISM DUE TO HASHIMOTO'S THYROIDITIS: ICD-10-CM

## 2023-12-04 RX ORDER — LEVOTHYROXINE SODIUM 88 UG/1
88 TABLET ORAL EVERY OTHER DAY
Qty: 45 TABLET | Refills: 0 | Status: SHIPPED | OUTPATIENT
Start: 2023-12-04 | End: 2024-01-29 | Stop reason: SDUPTHER

## 2024-01-29 DIAGNOSIS — E06.3 HYPOTHYROIDISM DUE TO HASHIMOTO'S THYROIDITIS: ICD-10-CM

## 2024-01-29 DIAGNOSIS — E03.8 HYPOTHYROIDISM DUE TO HASHIMOTO'S THYROIDITIS: ICD-10-CM

## 2024-01-29 RX ORDER — LEVOTHYROXINE SODIUM 88 UG/1
88 TABLET ORAL EVERY OTHER DAY
Qty: 45 TABLET | Refills: 0 | Status: SHIPPED | OUTPATIENT
Start: 2024-01-29 | End: 2024-04-15 | Stop reason: SDUPTHER

## 2024-02-12 ENCOUNTER — LAB (OUTPATIENT)
Dept: LAB | Facility: LAB | Age: 79
End: 2024-02-12
Payer: MEDICARE

## 2024-02-12 ENCOUNTER — OFFICE VISIT (OUTPATIENT)
Dept: PRIMARY CARE | Facility: CLINIC | Age: 79
End: 2024-02-12
Payer: MEDICARE

## 2024-02-12 VITALS — DIASTOLIC BLOOD PRESSURE: 70 MMHG | SYSTOLIC BLOOD PRESSURE: 130 MMHG | BODY MASS INDEX: 21.08 KG/M2 | WEIGHT: 119 LBS

## 2024-02-12 DIAGNOSIS — M81.0 OSTEOPOROSIS, UNSPECIFIED OSTEOPOROSIS TYPE, UNSPECIFIED PATHOLOGICAL FRACTURE PRESENCE: ICD-10-CM

## 2024-02-12 DIAGNOSIS — R53.81 PHYSICAL DECONDITIONING: ICD-10-CM

## 2024-02-12 DIAGNOSIS — N18.30 STAGE 3 CHRONIC KIDNEY DISEASE, UNSPECIFIED WHETHER STAGE 3A OR 3B CKD (MULTI): ICD-10-CM

## 2024-02-12 DIAGNOSIS — M25.352 INSTABILITY OF BOTH HIPS: ICD-10-CM

## 2024-02-12 DIAGNOSIS — E11.9 TYPE 2 DIABETES MELLITUS WITHOUT COMPLICATION, WITHOUT LONG-TERM CURRENT USE OF INSULIN (MULTI): ICD-10-CM

## 2024-02-12 DIAGNOSIS — M25.351 INSTABILITY OF BOTH HIPS: ICD-10-CM

## 2024-02-12 DIAGNOSIS — I25.5 ISCHEMIC DILATED CARDIOMYOPATHY (MULTI): ICD-10-CM

## 2024-02-12 DIAGNOSIS — I73.9 PERIPHERAL VASCULAR DISEASE (CMS-HCC): ICD-10-CM

## 2024-02-12 DIAGNOSIS — I50.20 HFREF (HEART FAILURE WITH REDUCED EJECTION FRACTION) (MULTI): ICD-10-CM

## 2024-02-12 DIAGNOSIS — E03.9 HYPOTHYROIDISM, UNSPECIFIED TYPE: ICD-10-CM

## 2024-02-12 DIAGNOSIS — R06.2 WHEEZING: ICD-10-CM

## 2024-02-12 DIAGNOSIS — I10 ESSENTIAL HYPERTENSION: ICD-10-CM

## 2024-02-12 DIAGNOSIS — D64.9 ANEMIA, UNSPECIFIED TYPE: ICD-10-CM

## 2024-02-12 DIAGNOSIS — E55.9 VITAMIN D DEFICIENCY: ICD-10-CM

## 2024-02-12 DIAGNOSIS — I42.0 ISCHEMIC DILATED CARDIOMYOPATHY (MULTI): ICD-10-CM

## 2024-02-12 DIAGNOSIS — I50.42 CHRONIC COMBINED SYSTOLIC AND DIASTOLIC HEART FAILURE, NYHA CLASS 2 (MULTI): ICD-10-CM

## 2024-02-12 DIAGNOSIS — I25.10 CORONARY ARTERY DISEASE, UNSPECIFIED VESSEL OR LESION TYPE, UNSPECIFIED WHETHER ANGINA PRESENT, UNSPECIFIED WHETHER NATIVE OR TRANSPLANTED HEART: ICD-10-CM

## 2024-02-12 DIAGNOSIS — E11.9 TYPE 2 DIABETES MELLITUS WITHOUT COMPLICATION, WITHOUT LONG-TERM CURRENT USE OF INSULIN (MULTI): Primary | ICD-10-CM

## 2024-02-12 DIAGNOSIS — E78.5 HYPERLIPIDEMIA, UNSPECIFIED HYPERLIPIDEMIA TYPE: ICD-10-CM

## 2024-02-12 LAB
ALBUMIN SERPL BCP-MCNC: 4.2 G/DL (ref 3.4–5)
ANION GAP SERPL CALC-SCNC: 15 MMOL/L (ref 10–20)
BNP SERPL-MCNC: 67 PG/ML (ref 0–99)
BUN SERPL-MCNC: 13 MG/DL (ref 6–23)
CALCIUM SERPL-MCNC: 9.9 MG/DL (ref 8.6–10.6)
CHLORIDE SERPL-SCNC: 101 MMOL/L (ref 98–107)
CO2 SERPL-SCNC: 27 MMOL/L (ref 21–32)
CREAT SERPL-MCNC: 1.16 MG/DL (ref 0.5–1.05)
EGFRCR SERPLBLD CKD-EPI 2021: 48 ML/MIN/1.73M*2
ERYTHROCYTE [DISTWIDTH] IN BLOOD BY AUTOMATED COUNT: 15.4 % (ref 11.5–14.5)
EST. AVERAGE GLUCOSE BLD GHB EST-MCNC: 209 MG/DL
FERRITIN SERPL-MCNC: 791 NG/ML (ref 8–150)
FOLATE SERPL-MCNC: 20.3 NG/ML
GLUCOSE SERPL-MCNC: 276 MG/DL (ref 74–99)
HBA1C MFR BLD: 8.9 %
HCT VFR BLD AUTO: 33.5 % (ref 36–46)
HGB BLD-MCNC: 9.7 G/DL (ref 12–16)
IRON SATN MFR SERPL: 7 % (ref 25–45)
IRON SERPL-MCNC: 15 UG/DL (ref 35–150)
MCH RBC QN AUTO: 18.8 PG (ref 26–34)
MCHC RBC AUTO-ENTMCNC: 29 G/DL (ref 32–36)
MCV RBC AUTO: 65 FL (ref 80–100)
NRBC BLD-RTO: 0 /100 WBCS (ref 0–0)
PHOSPHATE SERPL-MCNC: 3.3 MG/DL (ref 2.5–4.9)
PLATELET # BLD AUTO: 377 X10*3/UL (ref 150–450)
POTASSIUM SERPL-SCNC: 4.7 MMOL/L (ref 3.5–5.3)
RBC # BLD AUTO: 5.15 X10*6/UL (ref 4–5.2)
SODIUM SERPL-SCNC: 138 MMOL/L (ref 136–145)
TIBC SERPL-MCNC: 218 UG/DL (ref 240–445)
UIBC SERPL-MCNC: 203 UG/DL (ref 110–370)
VIT B12 SERPL-MCNC: 495 PG/ML (ref 211–911)
WBC # BLD AUTO: 6.6 X10*3/UL (ref 4.4–11.3)

## 2024-02-12 PROCEDURE — G0439 PPPS, SUBSEQ VISIT: HCPCS | Performed by: INTERNAL MEDICINE

## 2024-02-12 PROCEDURE — 1170F FXNL STATUS ASSESSED: CPT | Performed by: INTERNAL MEDICINE

## 2024-02-12 PROCEDURE — 99214 OFFICE O/P EST MOD 30 MIN: CPT | Performed by: INTERNAL MEDICINE

## 2024-02-12 PROCEDURE — 36415 COLL VENOUS BLD VENIPUNCTURE: CPT

## 2024-02-12 PROCEDURE — 83540 ASSAY OF IRON: CPT

## 2024-02-12 PROCEDURE — 1036F TOBACCO NON-USER: CPT | Performed by: INTERNAL MEDICINE

## 2024-02-12 PROCEDURE — 3078F DIAST BP <80 MM HG: CPT | Performed by: INTERNAL MEDICINE

## 2024-02-12 PROCEDURE — 82746 ASSAY OF FOLIC ACID SERUM: CPT

## 2024-02-12 PROCEDURE — 1159F MED LIST DOCD IN RCRD: CPT | Performed by: INTERNAL MEDICINE

## 2024-02-12 PROCEDURE — 83036 HEMOGLOBIN GLYCOSYLATED A1C: CPT

## 2024-02-12 PROCEDURE — 1126F AMNT PAIN NOTED NONE PRSNT: CPT | Performed by: INTERNAL MEDICINE

## 2024-02-12 PROCEDURE — 83880 ASSAY OF NATRIURETIC PEPTIDE: CPT

## 2024-02-12 PROCEDURE — 1160F RVW MEDS BY RX/DR IN RCRD: CPT | Performed by: INTERNAL MEDICINE

## 2024-02-12 PROCEDURE — 82728 ASSAY OF FERRITIN: CPT

## 2024-02-12 PROCEDURE — 85027 COMPLETE CBC AUTOMATED: CPT

## 2024-02-12 PROCEDURE — 3075F SYST BP GE 130 - 139MM HG: CPT | Performed by: INTERNAL MEDICINE

## 2024-02-12 PROCEDURE — 82607 VITAMIN B-12: CPT

## 2024-02-12 PROCEDURE — 83550 IRON BINDING TEST: CPT

## 2024-02-12 PROCEDURE — 80069 RENAL FUNCTION PANEL: CPT

## 2024-02-12 RX ORDER — PRAVASTATIN SODIUM 40 MG/1
40 TABLET ORAL NIGHTLY
Qty: 90 TABLET | Refills: 2 | Status: SHIPPED | OUTPATIENT
Start: 2024-02-12 | End: 2024-06-03 | Stop reason: SDUPTHER

## 2024-02-12 ASSESSMENT — PATIENT HEALTH QUESTIONNAIRE - PHQ9
SUM OF ALL RESPONSES TO PHQ9 QUESTIONS 1 AND 2: 0
1. LITTLE INTEREST OR PLEASURE IN DOING THINGS: NOT AT ALL
2. FEELING DOWN, DEPRESSED OR HOPELESS: NOT AT ALL
1. LITTLE INTEREST OR PLEASURE IN DOING THINGS: NOT AT ALL
SUM OF ALL RESPONSES TO PHQ9 QUESTIONS 1 AND 2: 0
2. FEELING DOWN, DEPRESSED OR HOPELESS: NOT AT ALL

## 2024-02-12 ASSESSMENT — ENCOUNTER SYMPTOMS
SHORTNESS OF BREATH: 0
DIARRHEA: 0
DIZZINESS: 0
VOMITING: 0
OCCASIONAL FEELINGS OF UNSTEADINESS: 1
WHEEZING: 1
NAUSEA: 0
COLOR CHANGE: 0
FLANK PAIN: 0
NUMBNESS: 0
DYSURIA: 0
PALPITATIONS: 0
FEVER: 0
ABDOMINAL DISTENTION: 0
CONFUSION: 0
HEMATURIA: 0
COUGH: 1
LIGHT-HEADEDNESS: 0
LOSS OF SENSATION IN FEET: 1
DIFFICULTY URINATING: 0
ABDOMINAL PAIN: 0
MYALGIAS: 0
FREQUENCY: 0
CHILLS: 0

## 2024-02-12 ASSESSMENT — ACTIVITIES OF DAILY LIVING (ADL)
DOING_HOUSEWORK: NEEDS ASSISTANCE
BATHING: INDEPENDENT
MANAGING_FINANCES: INDEPENDENT
DRESSING: INDEPENDENT
BATHING: INDEPENDENT
GROCERY_SHOPPING: TOTAL CARE
BATHING: INDEPENDENT
DOING_HOUSEWORK: INDEPENDENT
GROCERY_SHOPPING: INDEPENDENT
TAKING_MEDICATION: NEEDS ASSISTANCE
MANAGING_FINANCES: INDEPENDENT
TAKING_MEDICATION: INDEPENDENT
DRESSING: INDEPENDENT
DRESSING: INDEPENDENT

## 2024-02-12 NOTE — PROGRESS NOTES
[id number]   Subjective:     History Of Present Illness:  Dana Corrigan is a 79 y.o. female with a PMH of HTN, HLD, DM2, hypothyroidism, osteoporosis, anemia, CAD, systolic-diastolic HF (EF 30-35%, 2022), who presents to [facility] clinic for wellness visit.  Otherwise, she reports experiencing exertional wheezing with productive cough and instability with walking that have been worsening since for the past year.  Denies traumatic/significant falls and other symptoms.  States that she saw her cardiologist and everything heart-related was okay.      Medicare Wellness Billing Compliance Satisfied    *This is a visual tool to show completion of required items on the day of the visit. Green checks will only appear on the date of visit.    Review all medications by prescribing practitioner or clinical pharmacist (such as prescriptions, OTCs, herbal therapies and supplements) documented in the medical record    Past Medical, Surgical, and Family History reviewed and updated in chart    Tobacco Use Reviewed    Alcohol Use Reviewed    Illicit Drug Use Reviewed    PHQ2/9    Falls in Last Year Reviewed    Home Safety Risk Factors Reviewed    Cognitive Impairment Reviewed    Patient Self Assessment and Health Status    Current Diet Reviewed    Exercise Frequency    ADL - Hearing Impairment    ADL - Bathing    ADL - Dressing    ADL - Walks in Home    IADL - Managing Finances    IADL - Grocery Shopping    IADL - Taking Medications    IADL - Doing Housework      Past Medical History:  She has a past medical history of Body mass index (BMI) 19.9 or less, adult, Body mass index (BMI) 20.0-20.9, adult (04/15/2022), and Encounter for screening mammogram for malignant neoplasm of breast.    Past Surgical History:  She has a past surgical history that includes Total vaginal hysterectomy (06/16/2013) and Colonoscopy (02/22/2013).     Social History:  She reports that she has quit smoking. Her smoking use included  cigarettes. She has never used smokeless tobacco. She reports that she does not currently use alcohol. She reports that she does not use drugs.    Family History:  Family History   Problem Relation Name Age of Onset   • Hypertension Mother     • Sarcoidosis Sister     • Breast cancer Other     • Colon cancer Other     • Diabetes Other         Allergies:  Jardiance [empagliflozin]    Home Medications:  (Not in a hospital admission)    Review Of Systems:  11-point ROS was performed and is negative except as noted below and in the HPI.     Review of Systems   Constitutional:  Negative for chills and fever.   Eyes:  Negative for visual disturbance.   Respiratory:  Positive for cough and wheezing. Negative for shortness of breath.    Cardiovascular:  Negative for chest pain, palpitations and leg swelling.   Gastrointestinal:  Negative for abdominal distention, abdominal pain, diarrhea, nausea and vomiting.   Genitourinary:  Negative for difficulty urinating, dysuria, flank pain, frequency and hematuria.   Musculoskeletal:  Negative for myalgias.   Skin:  Negative for color change.   Neurological:  Negative for dizziness, light-headedness and numbness.   Psychiatric/Behavioral:  Negative for confusion.         Objective   Objective:     /70   Wt 54 kg (119 lb)   BMI 21.08 kg/m²     Physical Exam  Constitutional:       General: She is not in acute distress.     Appearance: Normal appearance.   HENT:      Head: Normocephalic and atraumatic.      Mouth/Throat:      Mouth: Mucous membranes are moist.   Eyes:      Conjunctiva/sclera: Conjunctivae normal.      Pupils: Pupils are equal, round, and reactive to light.   Cardiovascular:      Rate and Rhythm: Normal rate and regular rhythm.      Heart sounds: Normal heart sounds.   Pulmonary:      Effort: No respiratory distress.      Breath sounds: Normal breath sounds. No wheezing or rhonchi.   Abdominal:      General: Bowel sounds are normal.      Palpations: Abdomen is  soft.      Tenderness: There is no abdominal tenderness.   Musculoskeletal:         General: No swelling.      Cervical back: Neck supple.   Skin:     General: Skin is warm and dry.   Neurological:      General: No focal deficit present.      Mental Status: She is alert.          Assessment & Plan:     Assessment/Plan     Problem List Items Addressed This Visit       Anemia    CAD (coronary artery disease)    Chronic combined systolic and diastolic heart failure, NYHA class 2 (CMS/[facility])    Essential hypertension    Hyperlipidemia    Relevant Medications    pravastatin (Pravachol) 40 mg tablet    Hypothyroidism    Ischemic dilated cardiomyopathy (CMS/[facility])    Osteoporosis    Peripheral vascular disease (CMS/[facility])    Vitamin D deficiency    Type 2 diabetes mellitus (CMS/[facility]) - Primary    Relevant Orders    Referral to Podiatry    Folate    Vitamin B12    Hemoglobin A1c    Stage 3 chronic kidney disease, unspecified whether stage 3a or 3b CKD (CMS/[facility])    Wheezing     - exertional, with productive cough with clear sputum  - not cardiac related considering she sees her cardiologist regularly  - likely pulm related d/t h/o smoking, not on inhalers  - will schedule with a pulmonologist for COPD/asthma/ILD rule out and PFT test         Relevant Orders    Referral to Pulmonology    Physical deconditioning     - presents with instability with walking  - encouraged to increase protein intake and exercise increase  - PT referral for muscle strengthening ordered          Other Visit Diagnoses       Instability of both hips        Relevant Orders    Referral to Physical Therapy    Folate    Vitamin B12    Hemoglobin A1c          #Health Maintenance:  - Podiatry referral for DM foot exam  - Pulmonology for wheezing & PFT  - PT for walking instability  - Routine labs at next visit    Revisit Topics: wheezing and instability with walking.    Dispo: Patient is scheduled to return to clinic in June.    Armen Escobar DO,  PGY-2  Internal Medicine     Disclaimer: Documentation completed with the information available at the time of input. The times in the chart may not be reflective of actual patient care times, interventions, or procedures. Documentation occurs after the physical care of the patient.

## 2024-02-12 NOTE — ASSESSMENT & PLAN NOTE
- exertional, with productive cough with clear sputum  - not cardiac related considering she sees her cardiologist regularly  - likely pulm related d/t h/o smoking, not on inhalers  - will schedule with a pulmonologist for COPD/asthma/ILD rule out and PFT test

## 2024-02-12 NOTE — ASSESSMENT & PLAN NOTE
- presents with instability with walking  - encouraged to increase protein intake and exercise increase  - PT referral for muscle strengthening ordered

## 2024-02-12 NOTE — PROGRESS NOTES
I saw and evaluated the patient. I personally obtained the key and critical portions of the history and physical exam or was physically present for key and critical portions performed by the resident/fellow. I reviewed the resident/fellow's documentation and discussed the patient with the resident/fellow. I agree with the resident/fellow's medical decision making as documented in the note.  Increase the dose of Pravastatin to 40 mg daily  Repeat fasting lipids at the next visit, if not at the goal, add Repatha  Pulmonary referral for suspected COPD and SOB.  Neuropathy work up initiated.  Follow up in 3 months      Patience Yarbrough MD

## 2024-02-23 ENCOUNTER — OFFICE VISIT (OUTPATIENT)
Dept: PULMONOLOGY | Facility: CLINIC | Age: 79
End: 2024-02-23
Payer: MEDICARE

## 2024-02-23 VITALS
HEART RATE: 106 BPM | WEIGHT: 119 LBS | OXYGEN SATURATION: 93 % | DIASTOLIC BLOOD PRESSURE: 88 MMHG | RESPIRATION RATE: 17 BRPM | TEMPERATURE: 97.4 F | HEIGHT: 63 IN | BODY MASS INDEX: 21.09 KG/M2 | SYSTOLIC BLOOD PRESSURE: 155 MMHG

## 2024-02-23 DIAGNOSIS — R53.81 PHYSICAL DECONDITIONING: ICD-10-CM

## 2024-02-23 DIAGNOSIS — R01.1 HEART MURMUR: ICD-10-CM

## 2024-02-23 DIAGNOSIS — R06.2 WHEEZING: ICD-10-CM

## 2024-02-23 DIAGNOSIS — R06.09 DYSPNEA ON EXERTION: Primary | ICD-10-CM

## 2024-02-23 PROCEDURE — 1159F MED LIST DOCD IN RCRD: CPT | Performed by: INTERNAL MEDICINE

## 2024-02-23 PROCEDURE — 99204 OFFICE O/P NEW MOD 45 MIN: CPT | Performed by: INTERNAL MEDICINE

## 2024-02-23 PROCEDURE — 1160F RVW MEDS BY RX/DR IN RCRD: CPT | Performed by: INTERNAL MEDICINE

## 2024-02-23 PROCEDURE — 1036F TOBACCO NON-USER: CPT | Performed by: INTERNAL MEDICINE

## 2024-02-23 PROCEDURE — 3079F DIAST BP 80-89 MM HG: CPT | Performed by: INTERNAL MEDICINE

## 2024-02-23 PROCEDURE — 99214 OFFICE O/P EST MOD 30 MIN: CPT | Performed by: INTERNAL MEDICINE

## 2024-02-23 PROCEDURE — 1126F AMNT PAIN NOTED NONE PRSNT: CPT | Performed by: INTERNAL MEDICINE

## 2024-02-23 PROCEDURE — 3077F SYST BP >= 140 MM HG: CPT | Performed by: INTERNAL MEDICINE

## 2024-02-23 RX ORDER — ALBUTEROL SULFATE 90 UG/1
2 AEROSOL, METERED RESPIRATORY (INHALATION) EVERY 4 HOURS PRN
Qty: 18 G | Refills: 5 | Status: SHIPPED | OUTPATIENT
Start: 2024-02-23 | End: 2024-06-03 | Stop reason: SDUPTHER

## 2024-02-23 ASSESSMENT — ENCOUNTER SYMPTOMS
SHORTNESS OF BREATH: 1
FATIGUE: 1
PALPITATIONS: 0
COUGH: 0
WHEEZING: 1

## 2024-02-23 NOTE — PROGRESS NOTES
Department of Medicine  Division of Pulmonary, Critical Care, and Sleep Medicine  Location  [facility], [address]    I was asked by Patience Yarbrough, *, to evaluate Dana Corrigan for dyspnea. I have independently interviewed and examined the patient in the office and reviewed available records.     Physician HPI (2/23/2024):  79 y.o. year-old female with severe CAD, HFrEF (recovered ejection fraction) who has developed shortness of breath in 2021 when she was diagnosed with new onset heart failure.  She has been doing well up until approximately 1 month ago when her shortness of breath got worse.  Patient is here with her daughter who believes that the patient may have developed a URI, but this never manifested as an acute illness.  She did complain of wheezing, and occasional productive cough of clearish sputum.  Patient's daughter states that a month ago she was able to ambulate about without much difficulty, however, she is now requiring a wheelchair to even move around from the parking lot to our office.    She denies any chest pain, weight gain, peripheral edema.  She has never been on any breathing medications.  She is a former 60-pack-year smoker who quit in 2020.  We have no PFTs on file for her.  Last chest imaging was from 2021 which was notable for emphysema and a right pleural effusion.  As part of her diagnostic workup in 2021 she had a right heart cath: RV: 37/8, mPAP: 21 PWCP: 7, CO 4.0,  (3.5WU).    PMH:  Past Medical History:   Diagnosis Date    Body mass index (BMI) 19.9 or less, adult     BMI less than 19,adult    Body mass index (BMI) 20.0-20.9, adult 04/15/2022    BMI 20.0-20.9, adult    Encounter for screening mammogram for malignant neoplasm of breast     Visit for screening mammogram       PSH:  Past Surgical History:   Procedure Laterality Date    COLONOSCOPY  02/22/2013    Complete Colonoscopy    TOTAL VAGINAL HYSTERECTOMY  06/16/2013    Vaginal Hysterectomy        FHx:  Family History   Problem Relation Name Age of Onset    Hypertension Mother      Sarcoidosis Sister      Breast cancer Other      Colon cancer Other      Diabetes Other         Social Hx:  Social History     Socioeconomic History    Marital status: Single     Spouse name: Not on file    Number of children: Not on file    Years of education: Not on file    Highest education level: Not on file   Occupational History    Not on file   Tobacco Use    Smoking status: Former     Types: Cigarettes    Smokeless tobacco: Never   Vaping Use    Vaping Use: Former   Substance and Sexual Activity    Alcohol use: Not Currently    Drug use: Never    Sexual activity: Not Currently   Other Topics Concern    Not on file   Social History Narrative    Not on file     Social Determinants of Health     Financial Resource Strain: Not on file   Food Insecurity: Not on file   Transportation Needs: Not on file   Physical Activity: Not on file   Stress: Not on file   Social Connections: Not on file   Intimate Partner Violence: Not on file   Housing Stability: Not on file       Immunization History:  Immunization History   Administered Date(s) Administered    Td vaccine, age 7 years and older (TDVAX) 02/11/2004       Current Medications:  Current Outpatient Medications   Medication Instructions    alendronate (FOSAMAX) 70 mg, oral, Every 7 days, Take in the morning with a full glass of water, on an empty stomach, and do not take anything else by mouth or lie down for the next 30 min.    aspirin 81 mg EC tablet 1 tablet, oral, Daily    blood-glucose meter (OneTouch Ultra2 Meter) misc 1 Units, miscellaneous, 3 times daily    cholecalciferol (Vitamin D-3) 50 MCG (2000 UT) tablet See admin instructions    lancets (OneTouch UltraSoft 2 Lancet) 30 gauge misc 1 Lancet, miscellaneous, 3 times daily    levothyroxine (SYNTHROID, LEVOXYL) 88 mcg, oral, Every other day    metFORMIN XR (Glucophage-XR) 500 mg 24 hr tablet 2 tablets, oral, Daily  "with evening meal    metoprolol succinate XL (Toprol-XL) 25 mg 24 hr tablet TAKE 1 TABLET BY MOUTH EVERY DAY    OneTouch Ultra Test strip 1 strip, miscellaneous, 3 times daily    pravastatin (PRAVACHOL) 40 mg, oral, Nightly    sacubitriL-valsartan (Entresto) 49-51 mg tablet 1 tablet, oral, 2 times daily    spironolactone (Aldactone) 25 mg tablet 0.5 tablets, oral, 2 times daily        Drug Allergies/Intolerances:  Allergies   Allergen Reactions    Jardiance [Empagliflozin] Itching        Review of Systems:  Review of Systems   Constitutional:  Positive for fatigue.   HENT:  Negative for congestion.    Respiratory:  Positive for shortness of breath and wheezing. Negative for cough.    Cardiovascular:  Negative for chest pain, palpitations and leg swelling.        All other review of systems are negative and/or non-contributory.    Physical Examination:  /88 (BP Location: Left arm, Patient Position: Sitting) Comment: Patient states she did not take medication  Pulse 106   Temp 36.3 °C (97.4 °F) (Temporal)   Resp 17   Ht 1.6 m (5' 3\")   Wt 54 kg (119 lb)   SpO2 93%   BMI 21.08 kg/m²      GEN: petite woman accompanied by her daughter  EYES: wears corrective lenses  ENT: Mallampati I,   CV: RRR, +systolic murmur  LUNGS: moderate effort, quiet breath sounds without wheezing  EXT: no edema, cyanosis, clubbing  NEURO: strength equal bilaterally, sensation grossly intact        Pulmonary Function Test Results     None    Exacerbation History     None    Chest Radiograph     XR CHEST 1 VIEW 10/28/2021    Impression  Mild cardiomegaly with a small to moderate right pleural effusion..      Chest CT Scan     CT CHEST PULMONARY EMBOLISM W IV CONTRAST 10/28/2021    HEART AND VESSELS:  No discrete filling defects within the main pulmonary artery or its  branches.    Main pulmonary artery is slightly dilated measuring 3.1 cm.    The thoracic aorta is of normal course and caliber with  mild-to-moderate vascular " calcifications.    Moderate coronary artery calcification.The study is not optimized for  evaluation of coronary arteries.    The cardiac chambers are not enlarged.    No evidence of pericardial effusion.    LUNGS AND AIRWAYS:  The trachea and central airways are patent. No endobronchial lesion.    There is moderate right pleural effusion. Atelectatic changes in  bilateral lower lobes.    Moderate to severe upper lung predominant emphysema.      Impression  1.  Significantly limited study due to motion artifact. Within the  limitations no central pulmonary embolism is identified. Unable to  assess for small segmental or subsegmental PE.  2.  Moderate right pleural effusion and bibasilar atelectasis.  3. Significant reflux of contrast into the dilated IVC and hepatic  veins. Correlation with right heart failure/strain/dysfunction. Mild  diffuse soft tissue edema/anasarca.  4. Moderate to severe upper lung predominant emphysema. Moderate  coronary artery calcification.  5. Dilated main pulmonary artery. Correlation with pulmonary artery  hypertension.  6. Other chronic findings as described above.       Bronchoscopy     None    Labs     Lab Results   Component Value Date    WBC 6.6 02/12/2024    HGB 9.7 (L) 02/12/2024    HCT 33.5 (L) 02/12/2024    MCV 65 (L) 02/12/2024     02/12/2024     Lab Results   Component Value Date    BNP 67 02/12/2024     Lab Results   Component Value Date    EOSABS 0.02 10/31/2021         Right heart catheterization     2021:  RA: 8/6 (5)  RV: 37/8  PA: 37/14 (21)  PCWP: 7  CO: 4.0  CI: 2.6  SVR: 1440 dynes  PVR: 280 dynes (3.5 PELAYO)    CAT and mMRC     mMRC (Modified Medical Research [place]) Dyspnea Scale  Too dyspneic to leave house or breathless when dressing: +4    Reference: Liss DA, Bran CK. Evaluation of clinical methods for rating dyspnea. CHEST. 1988;93(3):580-6.    ASSESSMENT & PLAN     Problem List Items Addressed This Visit       Dyspnea on exertion - Primary    Heart  murmur    Physical deconditioning    Wheezing    Relevant Medications    tiotropium-olodateroL (Stiolto Respimat) 2.5-2.5 mcg/actuation mist inhaler    albuterol (Ventolin HFA) 90 mcg/actuation inhaler    Other Relevant Orders    Complete Pulmonary Function Test Pre/Post Bronchodialator (Spirometry Pre/Post/DLCO/Lung Volumes)    Pulmonary Stress Test (6 Min. Walk)    Follow Up In Pulmonology        Summary:  79 y.o. year-old female here for dyspnea evaluation - multifactorial. She likely has underlying COPD which has never been formally diagnosed. She is also deconditioned. She does have significant CAD, but her EF has apparently recovered. She does have mitral regurgitation and it is unclear if this has acutely worsened. On her RHC from 2021, her PVR was mildly elevated, but PA pressures were normal, so cannot officially diagnose her with pulmonary HTN. Informed patient that the inhalers may not help improve her symptoms, but since she is treatment naive, it is worth investigating this option.    Plan:  -PFT and 6MWT to see if she is hypoxic with ambulation  -Start Stiolto (instructed on inhaler technique)  -Start PRN albuterol  -Recommend re-addressing her murmur with her cardiologist and potentially repeating echocardiogram  -Could be a good candidate for pulmonary rehab referral  -May need a repeat right heart cath     Follow-up: 3/26/2024        Von Jeffries DO  Staff Physician - Pulmonary & Critical Care  02/23/24 10:17 AM  Office number: [phone]   Fax number:  [phone]

## 2024-02-25 DIAGNOSIS — M81.0 OSTEOPOROSIS, UNSPECIFIED OSTEOPOROSIS TYPE, UNSPECIFIED PATHOLOGICAL FRACTURE PRESENCE: ICD-10-CM

## 2024-02-25 DIAGNOSIS — E55.9 VITAMIN D DEFICIENCY: ICD-10-CM

## 2024-02-26 ENCOUNTER — APPOINTMENT (OUTPATIENT)
Dept: RESPIRATORY THERAPY | Facility: HOSPITAL | Age: 79
End: 2024-02-26
Payer: MEDICARE

## 2024-02-26 RX ORDER — ALENDRONATE SODIUM 70 MG/1
70 TABLET ORAL
Qty: 12 TABLET | Refills: 1 | Status: SHIPPED | OUTPATIENT
Start: 2024-02-26 | End: 2024-06-03 | Stop reason: SDUPTHER

## 2024-02-27 DIAGNOSIS — I50.42 CHRONIC COMBINED SYSTOLIC AND DIASTOLIC HEART FAILURE, NYHA CLASS 2 (MULTI): Primary | ICD-10-CM

## 2024-02-27 DIAGNOSIS — D50.9 IRON DEFICIENCY ANEMIA, UNSPECIFIED IRON DEFICIENCY ANEMIA TYPE: ICD-10-CM

## 2024-02-27 RX ORDER — DIPHENHYDRAMINE HYDROCHLORIDE 50 MG/ML
50 INJECTION INTRAMUSCULAR; INTRAVENOUS AS NEEDED
Status: CANCELLED | OUTPATIENT
Start: 2024-02-28

## 2024-02-27 RX ORDER — EPINEPHRINE 0.3 MG/.3ML
0.3 INJECTION SUBCUTANEOUS EVERY 5 MIN PRN
Status: CANCELLED | OUTPATIENT
Start: 2024-02-28

## 2024-02-27 RX ORDER — ALBUTEROL SULFATE 0.83 MG/ML
3 SOLUTION RESPIRATORY (INHALATION) AS NEEDED
Status: CANCELLED | OUTPATIENT
Start: 2024-02-28

## 2024-02-27 RX ORDER — FAMOTIDINE 10 MG/ML
20 INJECTION INTRAVENOUS ONCE AS NEEDED
Status: CANCELLED | OUTPATIENT
Start: 2024-02-28

## 2024-02-28 ENCOUNTER — HOSPITAL ENCOUNTER (OUTPATIENT)
Dept: RESPIRATORY THERAPY | Facility: CLINIC | Age: 79
Discharge: HOME | End: 2024-02-28
Payer: MEDICARE

## 2024-02-28 ENCOUNTER — APPOINTMENT (OUTPATIENT)
Dept: RESPIRATORY THERAPY | Facility: CLINIC | Age: 79
End: 2024-02-28
Payer: MEDICARE

## 2024-02-28 DIAGNOSIS — R06.2 WHEEZING: ICD-10-CM

## 2024-02-28 PROCEDURE — 94618 PULMONARY STRESS TESTING: CPT

## 2024-02-28 PROCEDURE — 94727 GAS DIL/WSHOT DETER LNG VOL: CPT | Performed by: INTERNAL MEDICINE

## 2024-02-28 PROCEDURE — 94060 EVALUATION OF WHEEZING: CPT | Performed by: INTERNAL MEDICINE

## 2024-02-28 PROCEDURE — 94060 EVALUATION OF WHEEZING: CPT

## 2024-02-28 PROCEDURE — 94729 DIFFUSING CAPACITY: CPT

## 2024-02-28 PROCEDURE — 94727 GAS DIL/WSHOT DETER LNG VOL: CPT

## 2024-03-11 ENCOUNTER — DOCUMENTATION (OUTPATIENT)
Dept: INFUSION THERAPY | Facility: CLINIC | Age: 79
End: 2024-03-11
Payer: MEDICARE

## 2024-03-11 NOTE — PROGRESS NOTES
Patient to be scheduled for acute Injectafer (ferric carboxymaltose) infusions.   For Diagnosis: Iron Deficiency Anemia in heart failure with reduced ejection fraction     Labs…  Iron Studies completed on:   Lab Results   Component Value Date    IRON 15 (L) 02/12/2024    TIBC 218 (L) 02/12/2024    FERRITIN 791 (H) 02/12/2024      Lab Results   Component Value Date    WBC 6.6 02/12/2024    HGB 9.7 (L) 02/12/2024    HCT 33.5 (L) 02/12/2024    MCV 65 (L) 02/12/2024     02/12/2024        This result meets treatment criteria.

## 2024-03-12 ENCOUNTER — TELEPHONE (OUTPATIENT)
Dept: CARDIOLOGY | Facility: HOSPITAL | Age: 79
End: 2024-03-12
Payer: MEDICARE

## 2024-03-26 ENCOUNTER — OFFICE VISIT (OUTPATIENT)
Dept: PULMONOLOGY | Facility: CLINIC | Age: 79
End: 2024-03-26
Payer: MEDICARE

## 2024-03-26 VITALS
RESPIRATION RATE: 18 BRPM | WEIGHT: 119 LBS | BODY MASS INDEX: 21.09 KG/M2 | DIASTOLIC BLOOD PRESSURE: 82 MMHG | OXYGEN SATURATION: 98 % | HEART RATE: 75 BPM | HEIGHT: 63 IN | TEMPERATURE: 96.8 F | SYSTOLIC BLOOD PRESSURE: 176 MMHG

## 2024-03-26 DIAGNOSIS — R06.2 WHEEZING: ICD-10-CM

## 2024-03-26 DIAGNOSIS — J43.2 CENTRILOBULAR EMPHYSEMA (MULTI): Primary | ICD-10-CM

## 2024-03-26 DIAGNOSIS — I27.23: ICD-10-CM

## 2024-03-26 PROCEDURE — 1159F MED LIST DOCD IN RCRD: CPT | Performed by: INTERNAL MEDICINE

## 2024-03-26 PROCEDURE — 99215 OFFICE O/P EST HI 40 MIN: CPT | Performed by: INTERNAL MEDICINE

## 2024-03-26 PROCEDURE — 3079F DIAST BP 80-89 MM HG: CPT | Performed by: INTERNAL MEDICINE

## 2024-03-26 PROCEDURE — 1160F RVW MEDS BY RX/DR IN RCRD: CPT | Performed by: INTERNAL MEDICINE

## 2024-03-26 PROCEDURE — 3077F SYST BP >= 140 MM HG: CPT | Performed by: INTERNAL MEDICINE

## 2024-03-26 PROCEDURE — 1126F AMNT PAIN NOTED NONE PRSNT: CPT | Performed by: INTERNAL MEDICINE

## 2024-03-26 PROCEDURE — 1036F TOBACCO NON-USER: CPT | Performed by: INTERNAL MEDICINE

## 2024-03-26 ASSESSMENT — PAIN SCALES - GENERAL: PAINLEVEL: 0-NO PAIN

## 2024-03-26 NOTE — PROGRESS NOTES
Department of Medicine  Division of Pulmonary, Critical Care, and Sleep Medicine  Follow Up  [facility], [address]    Dana Corrigan returns as a follow up for dyspnea. Last visit was on 2/23/2024.    Physician HPI (3/26/2024):  79 y.o. year-old female here to review PFT results. She states that her breathing is somewhat improved since starting Stiolto.  She does admit to only using this on occasion, however.  She denies any wheeze, cough.  She is not very physically active. She is a former 60 pack/year smoker who quit in 2020. States she is being evaluated for iron infusions due to anemia.    I have personally reviewed PMH, PSH, Family History in the HISTORY tab of this chart, and unless noted above are not pertinent to the presenting complaint.  I have personally reviewed Social History as provided in the electronic medical record.    Immunization History:  Immunization History   Administered Date(s) Administered    Td vaccine, age 7 years and older (TDVAX) 02/11/2004       Current Medications:  Current Outpatient Medications   Medication Instructions    albuterol (Ventolin HFA) 90 mcg/actuation inhaler 2 puffs, inhalation, Every 4 hours PRN    alendronate (FOSAMAX) 70 mg, oral, Every 7 days, Take in the morning with a full glass of water, on an empty stomach, and do not take anything else by mouth or lie down for the next 30 min.    aspirin 81 mg EC tablet 1 tablet, oral, Daily    blood-glucose meter (OneTouch Ultra2 Meter) misc 1 Units, miscellaneous, 3 times daily    cholecalciferol (Vitamin D-3) 50 MCG (2000 UT) tablet See admin instructions    lancets (OneTouch UltraSoft 2 Lancet) 30 gauge misc 1 Lancet, miscellaneous, 3 times daily    levothyroxine (SYNTHROID, LEVOXYL) 88 mcg, oral, Every other day    metFORMIN XR (Glucophage-XR) 500 mg 24 hr tablet 2 tablets, oral, Daily with evening meal    metoprolol succinate XL (Toprol-XL) 25 mg 24 hr tablet TAKE 1 TABLET BY MOUTH EVERY DAY    OneTouch  "Ultra Test strip 1 strip, miscellaneous, 3 times daily    pravastatin (PRAVACHOL) 40 mg, oral, Nightly    sacubitriL-valsartan (Entresto) 49-51 mg tablet 1 tablet, oral, 2 times daily    spironolactone (Aldactone) 25 mg tablet 0.5 tablets, oral, 2 times daily    tiotropium-olodateroL (Stiolto Respimat) 2.5-2.5 mcg/actuation mist inhaler 2 Inhalations, inhalation, Daily        Drug Allergies/Intolerances:  Allergies   Allergen Reactions    Jardiance [Empagliflozin] Itching        Review of Systems:  Review of Systems     All other review of systems are negative and/or non-contributory.    Physical Examination:  /82 (BP Location: Right arm, Patient Position: Sitting)   Pulse 75   Temp 36 °C (96.8 °F) (Temporal)   Resp 18   Ht 1.6 m (5' 3\")   Wt 54 kg (119 lb)   SpO2 98%   BMI 21.08 kg/m²        GEN: appears well. In no respiratory distress. Here with her daughter  CV: systolic murmur  LUNGS: good effort, quiet breath sounds  EXT: no edema, cyanosis, clubbing        Exacerbation History     None    Pulmonary Function Test Results     2024:  FVC: 1.87 L (88%)  FEV1: 0.78 L (48%)  FEV1/FVC: 42  T.61 L (117%)  RV: 120%  DLCO: 37%  Minimal bronchodilator response.  Air trapping.      O2 Requirements     6MWT results: 207m 49% predicted. SpO2 jose 89% on room air.    Chest Radiograph     XR CHEST 1 VIEW 10/28/2021     Impression  Mild cardiomegaly with a small to moderate right pleural effusion.    Chest CT Scan   10/28/2021  1.  Significantly limited study due to motion artifact. Within the  limitations no central pulmonary embolism is identified. Unable to  assess for small segmental or subsegmental PE.  2.  Moderate right pleural effusion and bibasilar atelectasis.  3. Significant reflux of contrast into the dilated IVC and hepatic  veins. Correlation with right heart failure/strain/dysfunction. Mild  diffuse soft tissue edema/anasarca.  4. Moderate to severe upper lung predominant emphysema. " Moderate  coronary artery calcification.  5. Dilated main pulmonary artery. Correlation with pulmonary artery  hypertension.  6. Other chronic findings as described above.      Right heart catheterization      2021:  RA: 8/6 (5)  RV: 37/8  PA: 37/14 (21)  PCWP: 7  CO: 4.0  CI: 2.6  SVR: 1440 dynes  PVR: 280 dynes (3.5 PELAYO)    Labs     Lab Results   Component Value Date    WBC 6.6 02/12/2024    HGB 9.7 (L) 02/12/2024    HCT 33.5 (L) 02/12/2024    MCV 65 (L) 02/12/2024     02/12/2024     Lab Results   Component Value Date    BNP 67 02/12/2024     Lab Results   Component Value Date    EOSABS 0.02 10/31/2021       Echocardiogram     No results found for this or any previous visit from the past 365 days.       ASSESSMENT & PLAN     Problem List Items Addressed This Visit       Wheezing    WHO group 3 pulmonary arterial hypertension (CMS/HCC)     Other Visit Diagnoses       Centrilobular emphysema (CMS/HCC)    -  Primary    Relevant Orders    Referral to Pulmonary Rehabilitation             SUMMARY:  79 y.o. year-old female with severe GOLD group B COPD (FEV1 48%). Patient is doing overall better than at last visit. Absolute eosinophils 20. A1AT not tested. Her elevated PVR from her RHC in 2021 correlates with the severity of her COPD. She likely does have pulmonary HTN (group III), however, her mPAP was only 21. Her anemia is also a contributing factor to her dyspnea, but her severe COPD is likely the main culprit.    PLAN:  -Bronchodilators: continue Stiolto daily  -Lung cancer screening: CT 2021 without nodules. Will age out of annual LDCT this year, so will not be repeating  -Oxygen: no requirements  -Referral for pulmonary rehab placed  -Will follow up with her cardiologist next month    Follow-up: 6 months      Von Jeffries DO  Staff Physician - Pulmonary & Critical Care  03/26/24 11:49 AM  Office number: [phone]   Fax number:  [phone]

## 2024-03-27 ENCOUNTER — TELEPHONE (OUTPATIENT)
Dept: INFUSION THERAPY | Facility: CLINIC | Age: 79
End: 2024-03-27
Payer: MEDICARE

## 2024-04-06 DIAGNOSIS — E11.9 TYPE 2 DIABETES MELLITUS WITHOUT COMPLICATION, WITHOUT LONG-TERM CURRENT USE OF INSULIN (MULTI): ICD-10-CM

## 2024-04-06 RX ORDER — BLOOD SUGAR DIAGNOSTIC
1 STRIP MISCELLANEOUS 3 TIMES DAILY
Qty: 100 STRIP | Refills: 2 | Status: SHIPPED | OUTPATIENT
Start: 2024-04-06 | End: 2024-06-03 | Stop reason: SDUPTHER

## 2024-04-15 ENCOUNTER — OFFICE VISIT (OUTPATIENT)
Dept: PODIATRY | Facility: CLINIC | Age: 79
End: 2024-04-15
Payer: MEDICARE

## 2024-04-15 DIAGNOSIS — G62.9 NEUROPATHY: Primary | ICD-10-CM

## 2024-04-15 DIAGNOSIS — E03.8 HYPOTHYROIDISM DUE TO HASHIMOTO'S THYROIDITIS: ICD-10-CM

## 2024-04-15 DIAGNOSIS — E11.9 TYPE 2 DIABETES MELLITUS WITHOUT COMPLICATION, WITHOUT LONG-TERM CURRENT USE OF INSULIN (MULTI): ICD-10-CM

## 2024-04-15 DIAGNOSIS — E06.3 HYPOTHYROIDISM DUE TO HASHIMOTO'S THYROIDITIS: ICD-10-CM

## 2024-04-15 PROCEDURE — 1160F RVW MEDS BY RX/DR IN RCRD: CPT | Performed by: PODIATRIST

## 2024-04-15 PROCEDURE — 1159F MED LIST DOCD IN RCRD: CPT | Performed by: PODIATRIST

## 2024-04-15 PROCEDURE — 1036F TOBACCO NON-USER: CPT | Performed by: PODIATRIST

## 2024-04-15 PROCEDURE — 99203 OFFICE O/P NEW LOW 30 MIN: CPT | Performed by: PODIATRIST

## 2024-04-15 RX ORDER — LEVOTHYROXINE SODIUM 88 UG/1
88 TABLET ORAL EVERY OTHER DAY
Qty: 45 TABLET | Refills: 0 | Status: SHIPPED | OUTPATIENT
Start: 2024-04-15 | End: 2024-06-03 | Stop reason: SDUPTHER

## 2024-04-15 NOTE — PROGRESS NOTES
History of Present Illness:   Patient states they are here for Dm exam  Has some tingling to toes. Does not wake up at night  Most recent A1C is 8.9    Past Medical History  Past Medical History:   Diagnosis Date    Body mass index (BMI) 19.9 or less, adult     BMI less than 19,adult    Body mass index (BMI) 20.0-20.9, adult 04/15/2022    BMI 20.0-20.9, adult    Encounter for screening mammogram for malignant neoplasm of breast     Visit for screening mammogram       Medications and Allergies have been reviewed.    Review Of Systems:  GENERAL: No weight loss, malaise or fevers.  HEENT: Negative for frequent or significant headaches,   RESPIRATORY: Negative for cough, wheezing or shortness of breath.  CARDIOVASCULAR: Negative for chest pain, leg swelling or palpitations.    Examination of Both Lower Extremities:   Objective:   Vasc: DP and PT pulses are palpable bilateral.  CFT is less than 3 seconds bilateral.  Skin temperature is warm to cool proximal to distal bilateral.      Neuro: Vibratory, light touch and proprioception are intact bilateral.    Derm: Nails 1-5 bilateral are intact.  Skin is supple with normal texture and turgor noted.  Webspaces are clean, dry and intact bilateral.  There are no hyperkeratoses, ulcerations, verruca or other lesions noted.      Ortho: Muscle strength is 5/5 for all pedal groups tested.      1. Neuropathy        2. Type 2 diabetes mellitus without complication, without long-term current use of insulin (Multi)  Referral to Podiatry        Patient presents for Dm exam  Has neuropathy, A1C 8.9  Discussed lowering sugars  No need for med for neuropathy at this time  Daughter and patient in agreement to plan  Fu 6 mos  Sooner if any new prob arise.       Serena Sevilla DPM  [phone]  Option 2  Fax: [phone]

## 2024-04-24 ENCOUNTER — INFUSION (OUTPATIENT)
Dept: INFUSION THERAPY | Facility: CLINIC | Age: 79
End: 2024-04-24
Payer: MEDICARE

## 2024-04-24 VITALS
WEIGHT: 122.36 LBS | RESPIRATION RATE: 16 BRPM | SYSTOLIC BLOOD PRESSURE: 166 MMHG | HEART RATE: 73 BPM | DIASTOLIC BLOOD PRESSURE: 82 MMHG | TEMPERATURE: 98.1 F | OXYGEN SATURATION: 96 % | BODY MASS INDEX: 21.67 KG/M2

## 2024-04-24 DIAGNOSIS — D50.9 IRON DEFICIENCY ANEMIA, UNSPECIFIED IRON DEFICIENCY ANEMIA TYPE: ICD-10-CM

## 2024-04-24 DIAGNOSIS — I50.42 CHRONIC COMBINED SYSTOLIC AND DIASTOLIC HEART FAILURE, NYHA CLASS 2 (MULTI): ICD-10-CM

## 2024-04-24 PROCEDURE — 96365 THER/PROPH/DIAG IV INF INIT: CPT | Performed by: NURSE PRACTITIONER

## 2024-04-24 RX ORDER — ALBUTEROL SULFATE 0.83 MG/ML
3 SOLUTION RESPIRATORY (INHALATION) AS NEEDED
Status: CANCELLED | OUTPATIENT
Start: 2024-05-01

## 2024-04-24 RX ORDER — FAMOTIDINE 10 MG/ML
20 INJECTION INTRAVENOUS ONCE AS NEEDED
Status: CANCELLED | OUTPATIENT
Start: 2024-05-01

## 2024-04-24 RX ORDER — DIPHENHYDRAMINE HYDROCHLORIDE 50 MG/ML
50 INJECTION INTRAMUSCULAR; INTRAVENOUS AS NEEDED
Status: CANCELLED | OUTPATIENT
Start: 2024-05-01

## 2024-04-24 RX ORDER — EPINEPHRINE 0.3 MG/.3ML
0.3 INJECTION SUBCUTANEOUS EVERY 5 MIN PRN
Status: CANCELLED | OUTPATIENT
Start: 2024-05-01

## 2024-04-24 ASSESSMENT — ENCOUNTER SYMPTOMS
MYALGIAS: 0
DIARRHEA: 0
VOMITING: 0
NUMBNESS: 1
APPETITE CHANGE: 1
LEG SWELLING: 1
CONFUSION: 0
PALPITATIONS: 0
SHORTNESS OF BREATH: 1
ARTHRALGIAS: 0
NAUSEA: 0
DIZZINESS: 0
EYE PROBLEMS: 1
HEADACHES: 0
DEPRESSION: 0
SORE THROAT: 0
FATIGUE: 1
DECREASED CONCENTRATION: 0
UNEXPECTED WEIGHT CHANGE: 0
TROUBLE SWALLOWING: 0
NERVOUS/ANXIOUS: 0
CONSTIPATION: 0
COUGH: 0
DYSURIA: 0

## 2024-04-24 NOTE — PROGRESS NOTES
[facility]   Infusion Clinic Note   Date: 2024   Name: Dana Corrigan  : 1945   MRN: [id number]         Reason for Visit: OP Infusion (PT. HERE FOR INJECTAFER 750 MG IV INFUSION,/INFUSION # 1/2)         Visit Type: INFUSION       Ordered By: DR. MARIELLE LEYVA      Accompanied by : DAUGHTER      Diagnosis: Chronic combined systolic and diastolic heart failure, NYHA class 2 (Multi)    Iron deficiency anemia, unspecified iron deficiency anemia type       Allergies:   Allergies as of 2024 - Reviewed 2024   Allergen Reaction Noted    Jardiance [empagliflozin] Itching 2023         Current Medications has a current medication list which includes the following prescription(s): albuterol, alendronate, aspirin, blood-glucose meter, cholecalciferol, lancets, levothyroxine, metformin xr, metoprolol succinate xl, onetouch ultra test, pravastatin, sacubitril-valsartan, spironolactone, and tiotropium-olodaterol.       Vitals:   Vitals:    24 1317   BP: (!) 190/92   Pulse: 70   Resp: 18   Temp: 36.2 °C (97.2 °F)   SpO2: 99%   Weight: 55.5 kg (122 lb 5.7 oz)             Infusion Pre-procedure Checklist:   - Allergies reviewed: yes   - Medications reviewed: yes       - Previous reaction to current treatment: FIRST EVER IRON INFUSION TODAY      Assess patient for the concerns below. Document provider notification as appropriate.  - Active or recent infection with/without current antibiotic use: no  - Recent or planned invasive dental work: no  - Recent or planned surgeries: no  - Recently received or plans to receive vaccinations: no  - Has treatment related toxicities: no  - Is pregnant:  n/a      Pain: 0   - Is the pain different from normal: n/a   - Is the pain tolerable: n/a   - Is your Doctor aware:  n/a      Labs: N/A         Fall Risk Screening: Valentina Fall Risk  History of Falling, Immediate or Within 3 Months: No  Ambulatory Aid: Walks without aid/bedrest/nurse  "assist  Gait/Transferring: Normal/bedrest/immobile  Mental Status: Oriented to own ability       Review Of Systems:  Review of Systems   Constitutional:  Positive for appetite change and fatigue. Negative for unexpected weight change.        PT. FEELS HER APPETITE IS \"NOT VERY GOOD\"  DENIES RECENT WT. LOSS.  ADMITS TO SEVERE FATIGUE.   HENT:   Positive for hearing loss. Negative for mouth sores, sore throat and trouble swallowing.         PT. FEELS HER HEARING WAXES/ WANES.   Eyes:  Positive for eye problems.        ADMITS TO WATERY EYES.   Respiratory:  Positive for shortness of breath. Negative for cough.         PT. RECENTLY DX WITH COPD, DOES HAVE TRIPATHI.   Cardiovascular:  Positive for chest pain and leg swelling. Negative for palpitations.        PT. WITH HEART FAILURE, HTN.  ADMITS TO OCCASIONAL HEART PAIN.  PUFFINESS IN BOTH FEET.   Gastrointestinal:  Negative for constipation, diarrhea, nausea and vomiting.        PT. WITH DIABETES, FEELS BLOOD SUGARS COULD BE LOWER, 'S THIS AM.   Genitourinary:  Negative for dysuria.         HX OF STAGE 3 CKD.   Musculoskeletal:  Negative for arthralgias and myalgias.        HX OF OSTEOPOROSIS.   Neurological:  Positive for numbness. Negative for dizziness and headaches.        ADMITS TO NUMBNESS IN TOES ALL THE TIME.   Psychiatric/Behavioral:  Negative for confusion, decreased concentration and depression. The patient is not nervous/anxious.          Infusion Readiness:   - Assessment Concerns Related to Infusion: No  - Provider notified: n/a      Document Below Only If Indicated:   New Patient Education:    NEW PATIENT MEDICATION EDUCATION PT PROVIDED WITH WRITTEN ([employer] PT EDUCATION SHEET) AND VERBAL EDUCATION REGARDING MEDICATION GIVEN. VERIFIED MEDICATION NAME WITH PATIENT AND DISCUSSED REASON FOR USE. BRIEFLY DISCUSSED HOW MEDICATION WORKS AND EDUCATED ON GOAL OF TREATMENT, FREQUENCY OF TREATMENT, ADVERSE RXN'S AND COMMON SIDE EFFECTS TO MONITOR FOR. INSTRUCTED " PT TO ASSURE THAT ALL PROVIDERS INCLUDING DENTISTS ARE AWARE OF MEDICATION RECEIVED. DISCUSSED FLOW OF VISIT AND ORIENTED TO INFUSION CENTER. PT VERBALIZES UNDERSTANDING. CALL LIGHT PROVIDED AND PT AWARE TO ALERT STAFF OF ANY CONCERNS DURING TREATMENT.        Treatment Conditions & Drug Specific Questions:    Ferric Carboxymaltose  (INJECTAFER)    (Unless otherwise specified on patient specific therapy plan):     REMINDERS:  Recommended Vitals/Observation:  May cause transient hypertension.   Vitals: Obtain vital signs before and after each infusion.  Observation: 30 minutes after the infusion is complete.    Lab Results   Component Value Date    IRON 15 (L) 02/12/2024    TIBC 218 (L) 02/12/2024    FERRITIN 791 (H) 02/12/2024      Lab Results   Component Value Date    WBC 6.6 02/12/2024    HGB 9.7 (L) 02/12/2024    HCT 33.5 (L) 02/12/2024    MCV 65 (L) 02/12/2024     02/12/2024            Weight Based Drug Calculations: N/A            Initiated By: Dana Swan RN   Time: 2:33 PM     We administered ferric carboxymaltose (Injectafer) 750 mg in sodium chloride 0.9% 250 mL IV.

## 2024-04-24 NOTE — PATIENT INSTRUCTIONS
Today :We administered ferric carboxymaltose (Injectafer) 750 mg in sodium chloride 0.9% 250 mL IV.     For:   1. Chronic combined systolic and diastolic heart failure, NYHA class 2 (Multi)    2. Iron deficiency anemia, unspecified iron deficiency anemia type         Your next appointment is due in:  TUESDAY, APRIL 30,2024 @ 4:00PM     Please read the  Medication Guide that was given to you and reviewed during todays visit.     (Tell all doctors including dentists that you are taking this medication)     Go to the emergency room or call 911 if:  -You have signs of allergic reaction:   -Rash, hives, itching.   -Swollen, blistered, peeling skin.   -Swelling of face, lips, mouth, tongue or throat.   -Tightness of chest, trouble breathing, swallowing or talking     Call your doctor:  - If IV / injection site gets red, warm, swollen, itchy or leaks fluid or pus.     (Leave dressing on your IV site for at least 2 hours and keep area clean and dry  - If you get sick or have symptoms of infection or are not feeling well for any reason.    (Wash your hands often, stay away from people who are sick)  - If you have side effects from your medication that do not go away or are bothersome.     (Refer to the teaching your nurse gave you for side effects to call your doctor about)    - Common side effects may include:  stuffy nose, headache, feeling tired, muscle aches, upset stomach  - Before receiving any vaccines     - Call [facility] at   If:  - You get sick, are on antibiotics, have had a recent vaccine, have surgery or dental work and your doctor wants your visit rescheduled.  - You need to cancel and reschedule your visit for any reason. Call at least 2 days before your visit if you need to cancel.   - Your insurance changes before your next visit.    (We will need to get approval from your new insurance. This can take up to two weeks.)     The [facility] is opened Monday thru Friday. We  are closed on weekends and holidays.   Voice mail will take your call if the center is closed. If you leave a message please allow 24 hours for a call back during weekdays. If you leave a message on a weekend/holiday, we will call you back the next business day.

## 2024-04-30 ENCOUNTER — INFUSION (OUTPATIENT)
Dept: INFUSION THERAPY | Facility: CLINIC | Age: 79
End: 2024-04-30
Payer: MEDICARE

## 2024-04-30 VITALS
OXYGEN SATURATION: 98 % | HEART RATE: 80 BPM | RESPIRATION RATE: 17 BRPM | DIASTOLIC BLOOD PRESSURE: 68 MMHG | TEMPERATURE: 97.3 F | SYSTOLIC BLOOD PRESSURE: 186 MMHG

## 2024-04-30 DIAGNOSIS — I50.42 CHRONIC COMBINED SYSTOLIC AND DIASTOLIC HEART FAILURE, NYHA CLASS 2 (MULTI): ICD-10-CM

## 2024-04-30 DIAGNOSIS — D50.9 IRON DEFICIENCY ANEMIA, UNSPECIFIED IRON DEFICIENCY ANEMIA TYPE: ICD-10-CM

## 2024-04-30 PROCEDURE — 96365 THER/PROPH/DIAG IV INF INIT: CPT | Performed by: NURSE PRACTITIONER

## 2024-04-30 RX ORDER — EPINEPHRINE 0.3 MG/.3ML
0.3 INJECTION SUBCUTANEOUS EVERY 5 MIN PRN
OUTPATIENT
Start: 2024-05-01

## 2024-04-30 RX ORDER — ALBUTEROL SULFATE 0.83 MG/ML
3 SOLUTION RESPIRATORY (INHALATION) AS NEEDED
OUTPATIENT
Start: 2024-05-01

## 2024-04-30 RX ORDER — FAMOTIDINE 10 MG/ML
20 INJECTION INTRAVENOUS ONCE AS NEEDED
OUTPATIENT
Start: 2024-05-01

## 2024-04-30 RX ORDER — DIPHENHYDRAMINE HYDROCHLORIDE 50 MG/ML
50 INJECTION INTRAMUSCULAR; INTRAVENOUS AS NEEDED
OUTPATIENT
Start: 2024-05-01

## 2024-04-30 ASSESSMENT — ENCOUNTER SYMPTOMS
MYALGIAS: 0
NUMBNESS: 1
SORE THROAT: 0
CONFUSION: 0
TROUBLE SWALLOWING: 0
DEPRESSION: 0
SHORTNESS OF BREATH: 1
PALPITATIONS: 0
NAUSEA: 0
EYE PROBLEMS: 1
NERVOUS/ANXIOUS: 0
DIARRHEA: 0
DECREASED CONCENTRATION: 0
FATIGUE: 1
DIZZINESS: 0
VOMITING: 0
APPETITE CHANGE: 1
COUGH: 0
HEADACHES: 0
DYSURIA: 0
ARTHRALGIAS: 0
LEG SWELLING: 1
UNEXPECTED WEIGHT CHANGE: 0
CONSTIPATION: 0

## 2024-04-30 NOTE — PATIENT INSTRUCTIONS
Today :We administered ferric carboxymaltose (Injectafer) 750 mg in sodium chloride 0.9% 250 mL IV.     For:   1. Chronic combined systolic and diastolic heart failure, NYHA class 2 (Multi)    2. Iron deficiency anemia, unspecified iron deficiency anemia type         Your next appointment is due in:  INFUSIONS COMPLETE         Please read the  Medication Guide that was given to you and reviewed during todays visit.     (Tell all doctors including dentists that you are taking this medication)     Go to the emergency room or call 911 if:  -You have signs of allergic reaction:   -Rash, hives, itching.   -Swollen, blistered, peeling skin.   -Swelling of face, lips, mouth, tongue or throat.   -Tightness of chest, trouble breathing, swallowing or talking     Call your doctor:  - If IV / injection site gets red, warm, swollen, itchy or leaks fluid or pus.     (Leave dressing on your IV site for at least 2 hours and keep area clean and dry  - If you get sick or have symptoms of infection or are not feeling well for any reason.    (Wash your hands often, stay away from people who are sick)  - If you have side effects from your medication that do not go away or are bothersome.     (Refer to the teaching your nurse gave you for side effects to call your doctor about)    - Common side effects may include:  stuffy nose, headache, feeling tired, muscle aches, upset stomach  - Before receiving any vaccines     - Call the [facility] at   If:  - You get sick, are on antibiotics, have had a recent vaccine, have surgery or dental work and your doctor wants your visit rescheduled.  - You need to cancel and reschedule your visit for any reason. Call at least 2 days before your visit if you need to cancel.   - Your insurance changes before your next visit.    (We will need to get approval from your new insurance. This can take up to two weeks.)     The [facility] is opened Monday thru Friday. We are  closed on weekends and holidays.   Voice mail will take your call if the center is closed. If you leave a message please allow 24 hours for a call back during weekdays. If you leave a message on a weekend/holiday, we will call you back the next business day.

## 2024-04-30 NOTE — PROGRESS NOTES
[facility]   Infusion Clinic Note   Date: 2024   Name: Dana Corrigan  : 1945   MRN: [id number]         Reason for Visit: Follow-up and OP Infusion (PT HERE FOR DAY 2/2 INJECTAFER 750MG/NEXT APPT: INFUSIONS COMPLETE )         Visit Type: INFUSION       Ordered By: DR. MARIELLE LEYVA      Accompanied by : DAUGHTER      Diagnosis: Chronic combined systolic and diastolic heart failure, NYHA class 2 (Multi)    Iron deficiency anemia, unspecified iron deficiency anemia type       Allergies:   Allergies as of 2024 - Reviewed 2024   Allergen Reaction Noted    Jardiance [empagliflozin] Itching 2023         Current Medications has a current medication list which includes the following prescription(s): albuterol, alendronate, aspirin, blood-glucose meter, cholecalciferol, lancets, levothyroxine, metformin xr, metoprolol succinate xl, onetouch ultra test, pravastatin, sacubitril-valsartan, spironolactone, and tiotropium-olodaterol.       Vitals:   Vitals:    24 1615 24 1645 24 1715   BP: 162/88 (!) 180/99 (!) 186/68   Pulse: 94 76 80   Resp: 16 16 17   Temp: 36.6 °C (97.8 °F) 36.4 °C (97.5 °F) 36.3 °C (97.3 °F)   SpO2: 96% 97% 98%               Infusion Pre-procedure Checklist:   - Allergies reviewed: yes   - Medications reviewed: yes       - Previous reaction to current treatment: NA      Assess patient for the concerns below. Document provider notification as appropriate.  - Active or recent infection with/without current antibiotic use: no  - Recent or planned invasive dental work: no  - Recent or planned surgeries: no  - Recently received or plans to receive vaccinations: no  - Has treatment related toxicities: no  - Is pregnant:  n/a      Pain: 0   - Is the pain different from normal: n/a   - Is the pain tolerable: n/a   - Is your Doctor aware:  n/a      Labs: N/A         Fall Risk Screening: Valentina Fall Risk  History of Falling, Immediate or Within 3  "Months: No  Secondary Diagnosis: No  Ambulatory Aid: Walks without aid/bedrest/nurse assist  Intravenous Therapy/Heparin Lock: No  Gait/Transferring: Weak  Mental Status: Oriented to own ability  Montgomery Fall Risk Score: 10       Review Of Systems:  Review of Systems   Constitutional:  Positive for appetite change and fatigue. Negative for unexpected weight change.        PT. FEELS HER APPETITE IS \"NOT VERY GOOD\"  DENIES RECENT WT. LOSS.  ADMITS TO SEVERE FATIGUE.   HENT:   Positive for hearing loss. Negative for mouth sores, sore throat and trouble swallowing.         PT. FEELS HER HEARING WAXES/ WANES.   Eyes:  Positive for eye problems.        ADMITS TO WATERY EYES.   Respiratory:  Positive for shortness of breath. Negative for cough.         PT. RECENTLY DX WITH COPD, DOES HAVE TRIPATHI.   Cardiovascular:  Positive for chest pain and leg swelling. Negative for palpitations.        PT. WITH HEART FAILURE, HTN.  ADMITS TO OCCASIONAL HEART PAIN.  PUFFINESS IN BOTH FEET.   Gastrointestinal:  Negative for constipation, diarrhea, nausea and vomiting.        PT. WITH DIABETES, FEELS BLOOD SUGARS COULD BE LOWER, 'S THIS AM.   Genitourinary:  Negative for dysuria.         HX OF STAGE 3 CKD.   Musculoskeletal:  Negative for arthralgias and myalgias.        HX OF OSTEOPOROSIS.   Neurological:  Positive for numbness. Negative for dizziness and headaches.        ADMITS TO NUMBNESS IN TOES ALL THE TIME.   Psychiatric/Behavioral:  Negative for confusion, decreased concentration and depression. The patient is not nervous/anxious.          Infusion Readiness:   - Assessment Concerns Related to Infusion: No  - Provider notified: n/a      Document Below Only If Indicated:   New Patient Education        Treatment Conditions & Drug Specific Questions:    Ferric Carboxymaltose  (INJECTAFER)    (Unless otherwise specified on patient specific therapy plan):     REMINDERS:  Recommended Vitals/Observation:  May cause transient " hypertension.   Vitals: Obtain vital signs before and after each infusion.  Observation: 30 minutes after the infusion is complete.    Lab Results   Component Value Date    IRON 15 (L) 02/12/2024    TIBC 218 (L) 02/12/2024    FERRITIN 791 (H) 02/12/2024      Lab Results   Component Value Date    WBC 6.6 02/12/2024    HGB 9.7 (L) 02/12/2024    HCT 33.5 (L) 02/12/2024    MCV 65 (L) 02/12/2024     02/12/2024            Weight Based Drug Calculations: N/A            Initiated By: Rick Radford RN   Time: 5:27 PM     We administered ferric carboxymaltose (Injectafer) 750 mg in sodium chloride 0.9% 250 mL IV.

## 2024-05-02 ENCOUNTER — TELEPHONE (OUTPATIENT)
Dept: CARDIAC REHAB | Facility: CLINIC | Age: 79
End: 2024-05-02
Payer: MEDICARE

## 2024-05-07 ENCOUNTER — OFFICE VISIT (OUTPATIENT)
Dept: CARDIOLOGY | Facility: CLINIC | Age: 79
End: 2024-05-07
Payer: MEDICARE

## 2024-05-07 VITALS
HEIGHT: 63 IN | WEIGHT: 124 LBS | OXYGEN SATURATION: 95 % | HEART RATE: 98 BPM | SYSTOLIC BLOOD PRESSURE: 178 MMHG | DIASTOLIC BLOOD PRESSURE: 84 MMHG | BODY MASS INDEX: 21.97 KG/M2

## 2024-05-07 DIAGNOSIS — H91.90 HEARING LOSS, UNSPECIFIED HEARING LOSS TYPE, UNSPECIFIED LATERALITY: ICD-10-CM

## 2024-05-07 DIAGNOSIS — E03.9 HYPOTHYROIDISM, UNSPECIFIED TYPE: ICD-10-CM

## 2024-05-07 DIAGNOSIS — I50.20 HFREF (HEART FAILURE WITH REDUCED EJECTION FRACTION) (MULTI): Primary | ICD-10-CM

## 2024-05-07 DIAGNOSIS — I50.32 HEART FAILURE WITH IMPROVED EJECTION FRACTION (HFIMPEF) (MULTI): ICD-10-CM

## 2024-05-07 DIAGNOSIS — I25.10 CORONARY ARTERY DISEASE, UNSPECIFIED VESSEL OR LESION TYPE, UNSPECIFIED WHETHER ANGINA PRESENT, UNSPECIFIED WHETHER NATIVE OR TRANSPLANTED HEART: ICD-10-CM

## 2024-05-07 PROCEDURE — 1160F RVW MEDS BY RX/DR IN RCRD: CPT | Performed by: STUDENT IN AN ORGANIZED HEALTH CARE EDUCATION/TRAINING PROGRAM

## 2024-05-07 PROCEDURE — 3079F DIAST BP 80-89 MM HG: CPT | Performed by: STUDENT IN AN ORGANIZED HEALTH CARE EDUCATION/TRAINING PROGRAM

## 2024-05-07 PROCEDURE — 1036F TOBACCO NON-USER: CPT | Performed by: STUDENT IN AN ORGANIZED HEALTH CARE EDUCATION/TRAINING PROGRAM

## 2024-05-07 PROCEDURE — 99215 OFFICE O/P EST HI 40 MIN: CPT | Performed by: STUDENT IN AN ORGANIZED HEALTH CARE EDUCATION/TRAINING PROGRAM

## 2024-05-07 PROCEDURE — 1159F MED LIST DOCD IN RCRD: CPT | Performed by: STUDENT IN AN ORGANIZED HEALTH CARE EDUCATION/TRAINING PROGRAM

## 2024-05-07 PROCEDURE — 3077F SYST BP >= 140 MM HG: CPT | Performed by: STUDENT IN AN ORGANIZED HEALTH CARE EDUCATION/TRAINING PROGRAM

## 2024-05-07 RX ORDER — SPIRONOLACTONE 25 MG/1
12.5 TABLET ORAL 2 TIMES DAILY
Qty: 90 TABLET | Refills: 3 | Status: SHIPPED | OUTPATIENT
Start: 2024-05-07 | End: 2024-06-03 | Stop reason: SDUPTHER

## 2024-05-07 RX ORDER — METOPROLOL SUCCINATE 25 MG/1
25 TABLET, EXTENDED RELEASE ORAL DAILY
Qty: 90 TABLET | Refills: 3 | Status: SHIPPED | OUTPATIENT
Start: 2024-05-07 | End: 2024-06-03 | Stop reason: SDUPTHER

## 2024-05-07 ASSESSMENT — ENCOUNTER SYMPTOMS
FOCAL WEAKNESS: 0
WEAKNESS: 0
ALTERED MENTAL STATUS: 0
SHORTNESS OF BREATH: 1
COUGH: 0
DYSPNEA ON EXERTION: 1
NEAR-SYNCOPE: 0
PND: 0
HEMATOCHEZIA: 0
WEIGHT LOSS: 0
IRREGULAR HEARTBEAT: 0
OCCASIONAL FEELINGS OF UNSTEADINESS: 1
ORTHOPNEA: 0
SYNCOPE: 0
PALPITATIONS: 0
WEIGHT GAIN: 0
DECREASED APPETITE: 0
HEMATURIA: 0

## 2024-05-07 ASSESSMENT — LIFESTYLE VARIABLES: SUBSTANCE_ABUSE: 0

## 2024-05-07 NOTE — PROGRESS NOTES
"Former cardiologist: Dr. Jay Carolina  Accompanied by: Daughter, Lisset      Chief Complaint    Ambulatory heart failure care         History of Present Illness      79 y.o.  retired  with past medical history of HFrEF (LVEF 30 to 35% as of 10/29/2021 TTE) secondary to ischemic cardiomyopathy, severe multivessel CAD that is currently medically managed, non-insulin-dependent diabetes mellitus, hypothyroidism, hypertension, and ex smoker quit 10/2021, who presents for follow-up regarding HFimprovedEF management.  She completed cardiac MRI 6/2023 which demonstrated LVEF 52%, with left ventricular hypertrophy, no evidence of scar, infiltration, or thrombus. Moreover there was inferior apical hypokinesis.  Cardiac MRI with regadenoson protocol 7/2023 showed \".. LVEF = 70%.  Nearly transmural subendocardial delayed enhancement involving the mid to apical inferior wall (RCA territory) with associated perfusion defect on stress imaging that indicates superimposed ischemia. Given the transmural scar, this myocardium is unlikely to be viable for revascularization. There is associated mild to moderate hypokinesis of the inferior wall that correlates to  this area. RVEF = 76%. \"     Between visits she has completed IV iron infusions, and endorses feeling less dyspneic than her previous baseline.  She continues to have some dyspnea at rest and with exertion which she attributes to her COPD.  She denies PND, orthopnea but describes bilateral chest discomfort that comes on when she is occasionally short of breath.  She denies leg swelling (has been using compression stockings).    She is mainly adherent with her heart failure medications but does concede to occasionally/infrequently missing her p.m. dose of ARNI.    Her last heart failure hospitalization 10/2021    Family history:  Mother- ? HF, ?CVA    Surgical history:  Bladder surgery    Obstetric history:  G2   No cardiac complications of surgery    Social " history:  -Former smoker (1/2 PPD x 50 years, quit 10/2021)  - No alcohol use.   - No illicit drug use.     Investigations:        The electronic medical record has been reviewed by me for salient history. All cardiovascular imaging and testing available in the electronic medical record, and [employer] has been reviewed.     Medication Documentation Review Audit       Reviewed by Ashley Salcedo RN (Registered Nurse) on 05/07/24 at 1123      Medication Order Taking? Sig Documenting Provider Last Dose Status   albuterol (Ventolin HFA) 90 mcg/actuation inhaler [id number] Yes Inhale 2 puffs every 4 hours if needed for wheezing or shortness of breath. Von Jeffries, DO Taking Active   alendronate (Fosamax) 70 mg tablet [id number] Yes TAKE 1 TABLET (70 MG) BY MOUTH EVERY 7 DAYS. TAKE IN THE MORNING WITH A FULL GLASS OF WATER, ON AN EMPTY STOMACH, AND DO NOT TAKE ANYTHING ELSE BY MOUTH OR LIE DOWN FOR THE NEXT 30 MIN. Patience Yarbrough MD Taking Active   aspirin 81 mg EC tablet [id number] Yes Take 1 tablet (81 mg) by mouth once daily. Historical Provider, MD Taking Active   blood-glucose meter (OneTouch Ultra2 Meter) misc [id number] Yes 1 Units 3 times a day. Armen Escobar DO Taking Active   cholecalciferol (Vitamin D-3) 50 MCG (2000 UT) tablet [id number] Yes See administration instructions. Historical Provider, MD Taking Active   lancets (OneTouch UltraSoft 2 Lancet) 30 gauge misc [id number] Yes 1 Lancet 3 times a day. Armen Escobar DO Taking Active   levothyroxine (Synthroid, Levoxyl) 88 mcg tablet [id number] Yes Take 1 tablet (88 mcg) by mouth every other day. Patience Yarbrough MD Taking Active   metFORMIN XR (Glucophage-XR) 500 mg 24 hr tablet [id number] Yes Take 2 tablets (1,000 mg) by mouth once daily in the evening. Take with meals. Historical Provider, MD Taking Active   metoprolol succinate XL (Toprol-XL) 25 mg 24 hr tablet [id number] Yes TAKE 1 TABLET BY MOUTH EVERY DAY Patience Yarbrough MD Taking Active   OneTouch  "Ultra Test strip [id number] Yes 1 STRIP 3 TIMES A DAY. Patience Yarbrough MD Taking Active   pravastatin (Pravachol) 40 mg tablet [id number] Yes Take 1 tablet (40 mg) by mouth once daily at bedtime. Armen Escobar DO Taking Active   sacubitriL-valsartan (Entresto) 49-51 mg tablet [id number] Yes Take 1 tablet by mouth 2 times a day. Daksha Henderson MD PhD Taking Active   spironolactone (Aldactone) 25 mg tablet [id number] Yes Take 0.5 tablets (12.5 mg) by mouth twice a day. Historical Provider, MD Taking Active   tiotropium-olodateroL (Stiolto Respimat) 2.5-2.5 mcg/actuation mist inhaler [id number] Yes Inhale 2 Inhalations once daily. Von Jeffries,  Taking Active                   Allergies   Allergen Reactions    Jardiance [Empagliflozin] Itching     Review of Systems   Constitutional: Negative for decreased appetite, weight gain and weight loss.   HENT:  Positive for hearing loss.    Eyes:  Negative for visual disturbance.   Cardiovascular:  Positive for chest pain, dyspnea on exertion and leg swelling. Negative for irregular heartbeat, near-syncope, orthopnea, palpitations, paroxysmal nocturnal dyspnea and syncope.   Respiratory:  Positive for shortness of breath. Negative for cough.    Skin:  Negative for rash.   Gastrointestinal:  Negative for hematochezia and melena.   Genitourinary:  Negative for hematuria.   Neurological:  Negative for focal weakness and weakness.   Psychiatric/Behavioral:  Negative for altered mental status and substance abuse.       Visit Vitals  /84   Pulse 98   Ht 1.6 m (5' 3\")   Wt 56.2 kg (124 lb)   SpO2 95%   BMI 21.97 kg/m²   Smoking Status Former   BSA 1.58 m²      Vitals:    05/07/24 1118 05/07/24 1123   BP: (!) 182/88 178/84   Pulse: 98    SpO2: 95%    Weight: 56.2 kg (124 lb)    Height: 1.6 m (5' 3\")       Physical Exam    Very pleasant thin elderly -American woman in no apparent CP or painful distress  Well groomed   Neck: No JVD or HJR  CVS: HS 1,2. gd 2 ESM at " [facility], GD 2 PSM at [facility]  Resp: CTA bilaterally. Percussion note hyperresonant  Abdomen: Flat, SNT, BS wnl  Extremities: No pedal oedema  Skin: warm and dry  CNS: AO x 4, hearing impairment       Results/Data  MRI Cardiac w/wo contrast for Morph/Funct and Valve Dz 2023 11:12AM Ruddy Garcia   ORDER REVISED TO A TH MRI CARDIAC W/WO CONTRAST FOR MORPH/FUNCT AND VALVE DZ BY RADIOLOGIST; Original Order Number: [id number]     Test Name Result Flag Reference   MRI Cardiac w/wo contrast for Morph/Funct and Valve Dz (Report)     FINAL REPORT  Interpreted by: CHANCE VINCENT CHAPMAN, MD   23 09:06  AddendCounts include 234 beds at the [facility]                              CMR Report      MRN:          [id number]                  Name:       SOPHIA CHASE                  :         1945                  Scan Date:  2023 09:04:35                    Electronically signed by Poncho Vincent  09:06:23     GENERAL INFORMATION   =====================================================================  =====================================     WEIGHT: 125.66 lbs  (57.00 kgs)  BASELINE HR: 81 BPM  SCAN LOCATION: [facility]  REFERRING PHYSICIAN: RUDDY VILLALTA  ATTENDING PHYSICIAN: RUDDY VILLALTA  ACCESSION NUMBER: [id number]  ICD10 CODES: I50.42, [ , ]I25.10  PATIENT HISTORY: DR RUDDY VILLALTA     SUMMARY   =====================================================================  =====================================     Low normal left ventricular function with mild concentric   hypertrophy.Focal inferoapical hypokinesia with  inferoapical and inferolateral thinning and corresponding resting   perfusion defect .Finding suggest prior  subendocardial ischemia/infarction.     LEFT VENTRICLE: There is mild LV hypertrophy. There is concentric   LVH. LV cavity size is mildly hypoplastic. LV systolic  function is regionally impaired.There is inferoapical hypokinesia.   There is no LV mass/thrombus.  Quantitative  LVEF 52 %.     VIABILITY: No scar or infiltrative disease.     RIGHT VENTRICLE: The right ventricle is normal.     LV/RV SEPTUM: The LV/RV septum is normal.     LA/RA SEPTUM: The interatrial septum is hypermobile.     LEFT ATRIUM: The left atrium is normal.     RIGHT ATRIUM: The right atrium is normal.     PLEURAL EFFUSION: There is no pleural effusion.     AORTIC VALVE: The aortic valve is normal.     MITRAL VALVE: There is mild mitral regurgitation.     AORTIC ROOT: The aortic root is normal.        CORE EXAM   =====================================================================  =====================================     MEASUREMENTS   ---------------------------------------------------------------------  -------------------    VOLUMETRIC ANALYSIS     ----------------------------------------------  .----------------------------------------------------.  .   .     . LV  . Reference . RV . Reference .  +-----+----------+------+-----------+----+-----------+  . EDV . ml    . 72.7 . () .  .      .  .   . ml/m\S\2  .   .      .  .      .  . ESV . ml    . 34.9 . (18-55) .  .      .  .   . ml/m\S\2  .   .      .  .      .  . CO . L/min  . 3.06 .      .  .      .  .   . L/min/m\S\2 .   .      .  .      .  .   . g/m\S\2   .   .      .  .      .  . SV . ml    .  38 . () .  .      .  .   . ml/m\S\2  .   .      .  .      .  . EF . %    .  52 . (60-78) .  .      .  '-----+----------+------+-----------+----+-----------'        17 SEGMENT   ---------------------------------------------------------------------  -------------------  .---------------------------------------------------------------------  --------------------.  . Segments      . Wall Motion . Hyperenhancement . Stress   Perfusion . Interpretation .  +--------------------+-------------+------------------+---------------  ---+----------------+  . Base Anterior   .       .         .           .        .  . Base Anteroseptal .       .         .           .         .  . Base Inferoseptal .       .         .           .        .  . Base Inferior   .       .         .           .        .  . Base Inferolateral .       .         .           .        .  . Base Anterolateral .       .         .           .        .  . Mid Anterior    .       .         .           .        .  . Mid Anteroseptal  .       .         .           .        .  . Mid Inferoseptal  .       .         .           .        .  . Mid Inferior    .       .         .           .        .  . Mid Inferolateral .       .         .           .        .  . Mid Anterolateral .       .         .           .        .  . Apical Anterior  .       .         .           .        .  . Apical Septal   .       .         .           .        .  . Apical Inferior  . Severe Hypo .         .           .        .  . Apical Lateral   .       .         .           .        .  . [place]        .       .         .           .        .  +--------------------+-------------+------------------+---------------  ---+----------------+  . RV Segments    . Wall Motion . Hyperenhancement . Stress   Perfusion . Interpretation .  +--------------------+-------------+------------------+---------------  ---+----------------+  . RV Basal Anterior .       .         .           .        .  . RV Basal Inferior .       .         .           .        .  . RV Mid       .       .         .           .        .  . RV Apical     .       .         .           .        .  '--------------------+-------------+------------------+---------------  ---+----------------'        SCAN INFO   =====================================================================  =====================================     GENERAL   ---------------------------------------------------------------------  -------------------    SCANNER     ----------------------------------------------      : [employer]      MODEL: Ingenia       CONTRAST AGENT      ----------------------------------------------      GD CONCENTRATION: 0.5 M        Report generated by [employer], a product of [employer]  Addendum [facility]                              CMR Report      MRN:          [id number]                  Name:       SOPHIA CHASE                  :         1945                  Scan Date:  2023 09:04:35                    Electronically signed by Poncho Vincent  08:50:09     GENERAL INFORMATION   =====================================================================  =====================================     WEIGHT: 125.66 lbs  (57.00 kgs)  BASELINE HR: 81 BPM  SCAN LOCATION: Health system  REFERRING PHYSICIAN: RUDDY VILLALTA  ATTENDING PHYSICIAN: RUDDY VILLALTA  ACCESSION NUMBER: [id number]  ICD10 CODES: I50.42, [ , ]I25.10  PATIENT HISTORY: DR RUDDY VILLALTA     SUMMARY   =====================================================================  =====================================     Low normal left ventricular function with mild concentric   hypertrophy.Focal inferoapical hypokinesia .     LEFT VENTRICLE: There is mild LV hypertrophy. There is concentric   LVH. LV cavity size is mildly hypoplastic. LV systolic  function is regionally impaired.There is inferoapical hypokinesia.   There is no LV mass/thrombus.  Quantitative LVEF 52 %.     VIABILITY: No scar or infiltrative disease.     RIGHT VENTRICLE: The right ventricle is normal.     LV/RV SEPTUM: The LV/RV septum is normal.     LA/RA SEPTUM: The interatrial septum is hypermobile.     LEFT ATRIUM: The left atrium is normal.     RIGHT ATRIUM: The right atrium is normal.     PLEURAL EFFUSION: There is no pleural effusion.     AORTIC VALVE: The aortic valve is normal.     MITRAL VALVE: There is mild mitral regurgitation.     AORTIC ROOT: The aortic root is normal.        CORE EXAM    =====================================================================  =====================================     MEASUREMENTS   ---------------------------------------------------------------------  -------------------    VOLUMETRIC ANALYSIS     ----------------------------------------------  .----------------------------------------------------.  .   .     . LV  . Reference . RV . Reference .  +-----+----------+------+-----------+----+-----------+  . EDV . ml    . 72.7 . () .  .      .  .   . ml/m\S\2  .   .      .  .      .  . ESV . ml    . 34.9 . (18-55) .  .      .  .   . ml/m\S\2  .   .      .  .      .  . CO . L/min  . 3.06 .      .  .      .  .   . L/min/m\S\2 .   .      .  .      .  .   . g/m\S\2   .   .      .  .      .  . SV . ml    .  38 . () .  .      .  .   . ml/m\S\2  .   .      .  .      .  . EF . %    .  52 . (60-78) .  .      .  '-----+----------+------+-----------+----+-----------'        17 SEGMENT   ---------------------------------------------------------------------  -------------------  .---------------------------------------------------------------------  --------------------.  . Segments      . Wall Motion . Hyperenhancement . Stress   Perfusion . Interpretation .  +--------------------+-------------+------------------+---------------  ---+----------------+  . Base Anterior   .       .         .           .        .  . Base Anteroseptal .       .         .           .        .  . Base Inferoseptal .       .         .           .        .  . Base Inferior   .       .         .           .        .  . Base Inferolateral .       .         .           .        .  . Base Anterolateral .       .         .           .        .  . Mid Anterior    .       .         .           .        .  . Mid Anteroseptal  .       .         .           .        .  . Mid Inferoseptal  .       .         .           .        .  . Mid Inferior    .       .         .           .        .  . Mid  Inferolateral .       .         .           .        .  . Mid Anterolateral .       .         .           .        .  . Apical Anterior  .       .         .           .        .  . Apical Septal   .       .         .           .        .  . Apical Inferior  . Severe Hypo .         .           .        .  . Apical Lateral   .       .         .           .        .  . [place]        .       .         .           .        .  +--------------------+-------------+------------------+---------------  ---+----------------+  . RV Segments    . Wall Motion . Hyperenhancement . Stress   Perfusion . Interpretation .  +--------------------+-------------+------------------+---------------  ---+----------------+  . RV Basal Anterior .       .         .           .        .  . RV Basal Inferior .       .         .           .        .  . RV Mid       .       .         .           .        .  . RV Apical     .       .         .           .        .  '--------------------+-------------+------------------+---------------  ---+----------------'        SCAN INFO   =====================================================================  =====================================     GENERAL   ---------------------------------------------------------------------  -------------------    SCANNER     ----------------------------------------------      : Countdown      MODEL: Ingenia       CONTRAST AGENT     ----------------------------------------------      GD CONCENTRATION: 0.5 M        Report generated by Pre[employer], a product of [employer]    Electronically signed by: CHANCE CHAVEZ  06/12/23 09:06     Impressions    79 y.o.  retired  with past medical history of HFrEF (LVEF 30 to 35% as of 10/29/2021 TTE) secondary to ischemic cardiomyopathy, severe multivessel CAD that is currently medically managed, non-insulin-dependent diabetes mellitus, hypothyroidism, hypertension, and ex smoker quit 10/2021, who  "presents for follow-up regarding HFimprovedEF management.  She completed cardiac MRI 6/2023 which demonstrated LVEF 52%, with left ventricular hypertrophy, no evidence of scar, infiltration, or thrombus. Moreover there was inferior apical hypokinesis.  Cardiac MRI with regadenoson protocol 7/2023 showed \".. LVEF = 70%.  Nearly transmural subendocardial delayed enhancement involving the mid to apical inferior wall (RCA territory) with associated perfusion defect on stress imaging that indicates superimposed ischemia. Given the transmural scar, this myocardium is unlikely to be viable for revascularization. There is associated mild to moderate hypokinesis of the inferior wall that correlates to  this area. RVEF = 76%. \"    ASSESSMENT / PLAN:    #Chronic HFimprovedEF, secondary to ischemic cardiomyopathy: NYHA class 2 symptoms, stage C, profile A (euvolemic) on exam today    -Redo order for cardiac MRI, to assess for viability  -Increase sacubitril/valsartan to 97/103 mg bid.  Encouraged to monitor blood pressure, and bring log to subsequent visits with cardiology and primary care  -Continue metoprolol succinate 25 mg p.o. once daily.  -Continue spironolactone 25 mg once daily  -Continue torsemide 20 mg p.o. once daily.  -She has been intolerant of SGLT2 inhibitors    #CAD  Severe mid and distal RCA lesions on 11/22/2021 left heart cath, not intervened upon due to small caliber vessel and unclear benefit without viability study. For that reason, intervention was not performed during hospital admission. Asymptomatic for angina/chest pain  Nonviable myocardium on cardiac MRI  -Continue aspirin 81 mg p.o. once daily.  -Continue  Pravastatin, did not tolerate Atorva    #Hypothyroidism.   -She does not have regular endocrinology follow-up, referred today    This note was transcribed using the Dragon Dictation system. There may be grammatical, punctuation, or verbiage errors that can occur with voice recognition programs.    "           Daksha Henderson MD PhD

## 2024-05-07 NOTE — PATIENT INSTRUCTIONS
She has established care with Pulmonology and is ** in pulmonary rehab    Thank you for coming in today. If you have any questions or concerns, you may call the Heart Failure Office at [phone] option [phone], or [phone].  You may also contact our heart failure nursing team via email on [email or web address].    For quicker results set-up your  [facility] account to receive results and other correspondence directly to your phone.    Please bring all your pills/medications to your Cardiology appointments.    **  - Start checking your blood pressure a couple times a week    - We will renew your heart medications today    - Please make the following medication changes:  1.  INCREASE Entresto to 97/103 mg twice daily    - Please have the following tests done:  1.Blood tests just before your next visit (RFP, BNP, CBC, iron, TIBC, ferritin)    2. Echocardiogram, in April 2025, we can schedule this for you today before you leave, or you may CALL  Tel [phone]   OR    [phone] to schedule      You will be referred to the following teams, CALL  [phone] to schedule your appointments with:  1. Audiology ( hearing impairment)    2. Endocrinology ( thyroid management)    - Please make an appointment to be seen in 1 year

## 2024-05-15 ENCOUNTER — APPOINTMENT (OUTPATIENT)
Dept: CARDIAC REHAB | Facility: CLINIC | Age: 79
End: 2024-05-15
Payer: MEDICARE

## 2024-05-15 ENCOUNTER — CLINICAL SUPPORT (OUTPATIENT)
Dept: CARDIAC REHAB | Facility: CLINIC | Age: 79
End: 2024-05-15
Payer: MEDICARE

## 2024-05-15 ENCOUNTER — TRANSCRIBE ORDERS (OUTPATIENT)
Dept: CARDIAC REHAB | Facility: CLINIC | Age: 79
End: 2024-05-15

## 2024-05-15 VITALS
OXYGEN SATURATION: 100 % | DIASTOLIC BLOOD PRESSURE: 90 MMHG | WEIGHT: 124 LBS | BODY MASS INDEX: 21.97 KG/M2 | SYSTOLIC BLOOD PRESSURE: 140 MMHG | HEIGHT: 63 IN

## 2024-05-15 DIAGNOSIS — J43.8 OTHER EMPHYSEMA (MULTI): Primary | ICD-10-CM

## 2024-05-15 DIAGNOSIS — J43.2 CENTRILOBULAR EMPHYSEMA (MULTI): ICD-10-CM

## 2024-05-15 ASSESSMENT — PATIENT HEALTH QUESTIONNAIRE - PHQ9
SUM OF ALL RESPONSES TO PHQ QUESTIONS 1-9: 6
SUM OF ALL RESPONSES TO PHQ9 QUESTIONS 1 & 2: 1
3. TROUBLE FALLING OR STAYING ASLEEP OR SLEEPING TOO MUCH: NOT AT ALL
6. FEELING BAD ABOUT YOURSELF - OR THAT YOU ARE A FAILURE OR HAVE LET YOURSELF OR YOUR FAMILY DOWN: NOT AT ALL
5. POOR APPETITE OR OVEREATING: MORE THAN HALF THE DAYS
9. THOUGHTS THAT YOU WOULD BE BETTER OFF DEAD, OR OF HURTING YOURSELF: NOT AT ALL
4. FEELING TIRED OR HAVING LITTLE ENERGY: NEARLY EVERY DAY
SUM OF ALL RESPONSES TO PHQ QUESTIONS 1-9: 6
1. LITTLE INTEREST OR PLEASURE IN DOING THINGS: NOT AT ALL
7. TROUBLE CONCENTRATING ON THINGS, SUCH AS READING THE NEWSPAPER OR WATCHING TELEVISION: NOT AT ALL
8. MOVING OR SPEAKING SO SLOWLY THAT OTHER PEOPLE COULD HAVE NOTICED. OR THE OPPOSITE, BEING SO FIGETY OR RESTLESS THAT YOU HAVE BEEN MOVING AROUND A LOT MORE THAN USUAL: NOT AT ALL
2. FEELING DOWN, DEPRESSED OR HOPELESS: SEVERAL DAYS

## 2024-05-15 NOTE — PROGRESS NOTES
Pulmonary Rehabilitation Initial Treatment Plan    Name: Dana Corrigan  Medical Record Number: [id number]  YOB: 1945  Age: 79 y.o.    Today’s Date: 5/15/2024  Primary Care Physician: Patience Yarbrough MD  Referring Physician: Von Jeffries DO  Program Location: [address]     General  Primary Diagnosis:   1. Centrilobular emphysema (Multi)  Referral to Pulmonary Rehabilitation         Onset/Date of Diagnosis: J43.2/ 2/28/24    Initial Assessment, not yet started program.    AACVPR Risk Stratification:       Falls Risk: High  Psychosocial Assessment    No data recorded    Sent PH-Q 9 to MD if score > 20: No; score < 20    Stress Management    Pt reported/currently experiencing stress: No  Patient uses stress management skills: No   History of: no history of anxiety or depression  Currently seeing a mental health provider: No  Social Support: Yes, Whom: Daughter   Pre CAT score: 14    Post CAT score: Survey to be given at discharge.   Learning Assessment:  Learning assessment/barriers: None  Preferred learning method:  Combination  Barriers: Hearing problems  Comments:    Stages of Change:Preparation    Psychosocial Plan    Goal Status: Initial Assessment; goals not yet started    Psychosocial Goals: Maintain or lower PH-Q 9 score by discharge    Psychosocial Interventions/Education:  To be done in pulmonary rehab     Initial Assessment: NA    Oxygen Assessment    SpO2 at rest: 100 %  SpO2 with exertion: 96 %    Oxygen Use    Supplemental O2 prescribed: No,   Liters at rest: Room Air  Liters with exertion: Room Air    SpO2 at rest: 100 %  SpO2 with exertion: 96 %      Hypoxia managed: Yes   Home Pulse Oximeter: Yes     Pre MMRC: 4  Post MMRC: NA    Oxygen Plan  Goal Status: Initial Assessment; goals not yet started  Oxygen Goals: Decrease MMRC score, Learn and use diaphragmatic breathing as needed, and Learn and use pursed lip breathing as needed    Oxygen Education/Interventions:   To be  done in Pulmonary Rehab.    Nutrition Assessment:    Hyperlipidemia: No     Lipids:   Lab Results   Component Value Date    CHOL 221 (H) 10/30/2023    HDL 58.1 10/30/2023    LDLF 78 07/11/2023    TRIG 177 (H) 10/30/2023       Current Dietary Guidelines: Low sodium, Diabetic   Barriers to dietary change: no    Diet Habit Survey: Picture Your Plate  Pre:  60  Post: To be done at discharge.    Diabetes Assessment    Lab Results   Component Value Date    HGBA1C 8.9 (H) 02/12/2024       History of Diabetes: Yes Pt monitors BS at home: No  Pt said that she is starting to check them more often. Told pt I need her to check pre workout.  Frequency: NA /day  FBS range: AIC 9.2 - Estimated average blood glucose  Hypoglycemic Episodes: No     Weight Management       No data recorded    Nutrition Plan    Goal Status: Initial Assessment; goals not yet started    Nutrition Goals: Maintain body weight    Nutrition Interventions/Education:   To be done in pulmonary rehab    Initial Assessment: NA    Exercise Assessment    No  Mode: NA  Frequency: NA  Duration: NA    6 Minute Walk Assessment     6 MWT distance:   300 ft. Pt stopped at 3 minutes and did not want to walk anymore due to leg pain.    FiO2 used during test: RA Liters    Exercise Prescription      Exercise Prescription based on: 6 Minute Walk Test          Frequency:  2 days/week   Mode: Treadmill, NuStep, and Recumbent Cycle   Duration: 30-45 total aerobic minutes   Intensity: RPE 10-12  Target HR:     MET Level: 1.1-2.5  Patient wears supplemental O2: No     Modality Workload METs Duration (minutes)   1 Pre-Exercise      2 Treadmill 0.5-0.6  1.3-1.4 6-10 :00   3 NuStep 1  2.5 6-10 :00   4 Recumbent Bike 1  1.1 6-10 :00   5         6 Post-Exercise        Resistance Training: No   Home Exercise Prescription given: To be given prior to discharge from program.    Exercise Plan    Goal Status: Initial Assessment; goals not yet started    Exercise Goals: Obtain 150  minutes/week of moderate intensity aerobic exercise, Initiate strength training 2-3 days a week, and Establish a home exercise program before discharge    Exercise Interventions/Education:   To be done in pulmonary rehab     Initial Assessment: NA    Other Core Components/Risk Factor Assessment:    Medication adherence  Current Medications:   Medication Documentation Review Audit       Reviewed by Ashley Salcedo RN (Registered Nurse) on 05/07/24 at 1123      Medication Order Taking? Sig Documenting Provider Last Dose Status   albuterol (Ventolin HFA) 90 mcg/actuation inhaler [id number] Yes Inhale 2 puffs every 4 hours if needed for wheezing or shortness of breath. Von Jeffries, DO Taking Active   alendronate (Fosamax) 70 mg tablet [id number] Yes TAKE 1 TABLET (70 MG) BY MOUTH EVERY 7 DAYS. TAKE IN THE MORNING WITH A FULL GLASS OF WATER, ON AN EMPTY STOMACH, AND DO NOT TAKE ANYTHING ELSE BY MOUTH OR LIE DOWN FOR THE NEXT 30 MIN. Patience Yarbrough MD Taking Active   aspirin 81 mg EC tablet [id number] Yes Take 1 tablet (81 mg) by mouth once daily. Historical Provider, MD Taking Active   blood-glucose meter (OneTouch Ultra2 Meter) misc [id number] Yes 1 Units 3 times a day. Armen Escobar DO Taking Active   cholecalciferol (Vitamin D-3) 50 MCG (2000 UT) tablet [id number] Yes See administration instructions. Historical Provider, MD Taking Active   lancets (OneTouch UltraSoft 2 Lancet) 30 gauge misc [id number] Yes 1 Lancet 3 times a day. Armen Escobar DO Taking Active   levothyroxine (Synthroid, Levoxyl) 88 mcg tablet [id number] Yes Take 1 tablet (88 mcg) by mouth every other day. Patience Yarbrough MD Taking Active   metFORMIN XR (Glucophage-XR) 500 mg 24 hr tablet [id number] Yes Take 2 tablets (1,000 mg) by mouth once daily in the evening. Take with meals. Historical Provider, MD Taking Active   metoprolol succinate XL (Toprol-XL) 25 mg 24 hr tablet [id number] Yes TAKE 1 TABLET BY MOUTH EVERY DAY Patience Yarbrough MD  Taking Active   OneTouch Ultra Test strip [id number] Yes 1 STRIP 3 TIMES A DAY. Patience Yarbrough MD Taking Active   pravastatin (Pravachol) 40 mg tablet [id number] Yes Take 1 tablet (40 mg) by mouth once daily at bedtime. Armen Escobar DO Taking Active   sacubitriL-valsartan (Entresto) 49-51 mg tablet [id number] Yes Take 1 tablet by mouth 2 times a day. Daksha Henderson MD PhD Taking Active   spironolactone (Aldactone) 25 mg tablet [id number] Yes Take 0.5 tablets (12.5 mg) by mouth twice a day. Historical Provider, MD Taking Active   tiotropium-olodateroL (Stiolto Respimat) 2.5-2.5 mcg/actuation mist inhaler [id number] Yes Inhale 2 Inhalations once daily. Von Jeffries DO Taking Active                                 Medication compliance: Yes   Uses pill box/organizer: No    Carries medication list: No    Uses Inhalers appropriately: Yes     Blood Pressure Management  History of Hypertension: Yes   Medication Changes: No   Resting BP: 140/90     Heart Failure Management  Hx of Heart Failure: Yes;   Type (selection): HFrEF  Most recent EF:  76 %     Onset of heart failure diagnosis:   Last heart failure hospitalization: 10/21  Number of HF admissions per year:     Symptoms: Fatigue with exertion, Shortness of breath, and LE edema  Is there a family Hx of HF:     Does patient obtain daily weight       KCCQ survey: Pre:   Post:     Heart Failure Reassessment: Initial Treatment Plan. Will reassess in 30 days.    Heart Failure Goals: Able to verbalize signs and symptoms of fluid retention and when to contact MD    Smoking/Tobacco Assessment  Social History     Tobacco Use   Smoking Status Former    Current packs/day: 0.00    Types: Cigarettes    Quit date: 2020    Years since quittin.3   Smokeless Tobacco Never       Other Core Component Plan    Goal Status: Initial Assessment; goals not yet started    Other Core Component Goals:  Hypertension    Other Core Component Interventions/Education:   Achieve  resting BP of < 130/80 by discharge    Initial Assessment: NA    Individual Patient Goals:    Breath Better  Become More conditioned    Goal Status: Initial Assessment; goals not yet started    Staff Comments:  Initial Assessment    Rehab Staff Signature: HEBERT LEARY, RRT

## 2024-05-21 ENCOUNTER — CLINICAL SUPPORT (OUTPATIENT)
Dept: CARDIAC REHAB | Facility: CLINIC | Age: 79
End: 2024-05-21
Payer: MEDICARE

## 2024-05-21 DIAGNOSIS — J43.8 OTHER EMPHYSEMA (MULTI): ICD-10-CM

## 2024-05-21 PROCEDURE — 94626 PHY/QHP OP PULM RHB W/MNTR: CPT | Performed by: INTERNAL MEDICINE

## 2024-05-23 ENCOUNTER — CLINICAL SUPPORT (OUTPATIENT)
Dept: CARDIAC REHAB | Facility: CLINIC | Age: 79
End: 2024-05-23
Payer: MEDICARE

## 2024-05-23 DIAGNOSIS — J43.8 OTHER EMPHYSEMA (MULTI): ICD-10-CM

## 2024-05-23 PROCEDURE — 94626 PHY/QHP OP PULM RHB W/MNTR: CPT | Performed by: INTERNAL MEDICINE

## 2024-05-28 ENCOUNTER — CLINICAL SUPPORT (OUTPATIENT)
Dept: CARDIAC REHAB | Facility: CLINIC | Age: 79
End: 2024-05-28
Payer: MEDICARE

## 2024-05-28 DIAGNOSIS — J43.8 OTHER EMPHYSEMA (MULTI): ICD-10-CM

## 2024-05-28 PROCEDURE — 94626 PHY/QHP OP PULM RHB W/MNTR: CPT

## 2024-05-28 NOTE — PROGRESS NOTES
Dana Corrigan  1945  [id number]  79 y.o.    [facility] [place] [id number]      Cardiac/Pulmonary Rehab Nutrition Education    5/28/2024  Gave and reviewed Pulm Nutrition handout. Pts daughter does most of the cooking and shopping for her. Reviewed sodium guidelines. Encouraged pt to further review handout at home and share with daughter. Encouraged to follow up with questions as needed.     Signature Nettie Moon RDN, LD

## 2024-05-30 ENCOUNTER — CLINICAL SUPPORT (OUTPATIENT)
Dept: CARDIAC REHAB | Facility: CLINIC | Age: 79
End: 2024-05-30
Payer: MEDICARE

## 2024-05-30 DIAGNOSIS — J43.8 OTHER EMPHYSEMA (MULTI): ICD-10-CM

## 2024-05-30 PROCEDURE — 94626 PHY/QHP OP PULM RHB W/MNTR: CPT | Performed by: INTERNAL MEDICINE

## 2024-06-03 ENCOUNTER — LAB (OUTPATIENT)
Dept: LAB | Facility: LAB | Age: 79
End: 2024-06-03
Payer: MEDICARE

## 2024-06-03 ENCOUNTER — OFFICE VISIT (OUTPATIENT)
Dept: PRIMARY CARE | Facility: CLINIC | Age: 79
End: 2024-06-03
Payer: MEDICARE

## 2024-06-03 VITALS — SYSTOLIC BLOOD PRESSURE: 122 MMHG | DIASTOLIC BLOOD PRESSURE: 82 MMHG | BODY MASS INDEX: 21.61 KG/M2 | WEIGHT: 122 LBS

## 2024-06-03 DIAGNOSIS — R06.2 WHEEZING: ICD-10-CM

## 2024-06-03 DIAGNOSIS — I50.20 HFREF (HEART FAILURE WITH REDUCED EJECTION FRACTION) (MULTI): ICD-10-CM

## 2024-06-03 DIAGNOSIS — E03.8 HYPOTHYROIDISM DUE TO HASHIMOTO'S THYROIDITIS: ICD-10-CM

## 2024-06-03 DIAGNOSIS — E55.9 VITAMIN D DEFICIENCY: ICD-10-CM

## 2024-06-03 DIAGNOSIS — I25.10 CORONARY ARTERY DISEASE, UNSPECIFIED VESSEL OR LESION TYPE, UNSPECIFIED WHETHER ANGINA PRESENT, UNSPECIFIED WHETHER NATIVE OR TRANSPLANTED HEART: ICD-10-CM

## 2024-06-03 DIAGNOSIS — E06.3 HYPOTHYROIDISM DUE TO HASHIMOTO'S THYROIDITIS: ICD-10-CM

## 2024-06-03 DIAGNOSIS — M81.0 OSTEOPOROSIS, UNSPECIFIED OSTEOPOROSIS TYPE, UNSPECIFIED PATHOLOGICAL FRACTURE PRESENCE: ICD-10-CM

## 2024-06-03 DIAGNOSIS — N18.30 STAGE 3 CHRONIC KIDNEY DISEASE, UNSPECIFIED WHETHER STAGE 3A OR 3B CKD (MULTI): ICD-10-CM

## 2024-06-03 DIAGNOSIS — E78.5 HYPERLIPIDEMIA, UNSPECIFIED HYPERLIPIDEMIA TYPE: ICD-10-CM

## 2024-06-03 DIAGNOSIS — E11.9 TYPE 2 DIABETES MELLITUS WITHOUT COMPLICATION, WITHOUT LONG-TERM CURRENT USE OF INSULIN (MULTI): ICD-10-CM

## 2024-06-03 DIAGNOSIS — D56.1 BETA-THALASSEMIA (MULTI): ICD-10-CM

## 2024-06-03 DIAGNOSIS — D50.8 IRON DEFICIENCY ANEMIA SECONDARY TO INADEQUATE DIETARY IRON INTAKE: ICD-10-CM

## 2024-06-03 DIAGNOSIS — I50.42 CHRONIC COMBINED SYSTOLIC AND DIASTOLIC HEART FAILURE, NYHA CLASS 2 (MULTI): Primary | ICD-10-CM

## 2024-06-03 DIAGNOSIS — I27.23: ICD-10-CM

## 2024-06-03 DIAGNOSIS — I50.42 CHRONIC COMBINED SYSTOLIC AND DIASTOLIC HEART FAILURE, NYHA CLASS 2 (MULTI): ICD-10-CM

## 2024-06-03 DIAGNOSIS — D50.8 OTHER IRON DEFICIENCY ANEMIA: ICD-10-CM

## 2024-06-03 DIAGNOSIS — D50.9 IRON DEFICIENCY ANEMIA, UNSPECIFIED IRON DEFICIENCY ANEMIA TYPE: ICD-10-CM

## 2024-06-03 LAB
ALBUMIN SERPL BCP-MCNC: 4.4 G/DL (ref 3.4–5)
ANION GAP SERPL CALC-SCNC: 12 MMOL/L (ref 10–20)
BUN SERPL-MCNC: 15 MG/DL (ref 6–23)
CALCIUM SERPL-MCNC: 9.5 MG/DL (ref 8.6–10.6)
CHLORIDE SERPL-SCNC: 104 MMOL/L (ref 98–107)
CO2 SERPL-SCNC: 26 MMOL/L (ref 21–32)
CREAT SERPL-MCNC: 0.97 MG/DL (ref 0.5–1.05)
EGFRCR SERPLBLD CKD-EPI 2021: 60 ML/MIN/1.73M*2
ERYTHROCYTE [DISTWIDTH] IN BLOOD BY AUTOMATED COUNT: 19.7 % (ref 11.5–14.5)
FERRITIN SERPL-MCNC: 1633 NG/ML (ref 8–150)
GLUCOSE SERPL-MCNC: 250 MG/DL (ref 74–99)
HCT VFR BLD AUTO: 33.5 % (ref 36–46)
HGB BLD-MCNC: 10 G/DL (ref 12–16)
IRON SATN MFR SERPL: 39 % (ref 25–45)
IRON SERPL-MCNC: 109 UG/DL (ref 35–150)
MCH RBC QN AUTO: 18.8 PG (ref 26–34)
MCHC RBC AUTO-ENTMCNC: 29.9 G/DL (ref 32–36)
MCV RBC AUTO: 63 FL (ref 80–100)
NRBC BLD-RTO: 0.4 /100 WBCS (ref 0–0)
PHOSPHATE SERPL-MCNC: 1.8 MG/DL (ref 2.5–4.9)
PLATELET # BLD AUTO: 245 X10*3/UL (ref 150–450)
POC HEMOGLOBIN A1C: 8.5 % (ref 4.2–6.5)
POTASSIUM SERPL-SCNC: 4.6 MMOL/L (ref 3.5–5.3)
RBC # BLD AUTO: 5.31 X10*6/UL (ref 4–5.2)
SODIUM SERPL-SCNC: 137 MMOL/L (ref 136–145)
TIBC SERPL-MCNC: 280 UG/DL (ref 240–445)
TSH SERPL-ACNC: 2.1 MIU/L (ref 0.44–3.98)
UIBC SERPL-MCNC: 171 UG/DL (ref 110–370)
WBC # BLD AUTO: 5.5 X10*3/UL (ref 4.4–11.3)

## 2024-06-03 PROCEDURE — 3079F DIAST BP 80-89 MM HG: CPT | Performed by: INTERNAL MEDICINE

## 2024-06-03 PROCEDURE — 83036 HEMOGLOBIN GLYCOSYLATED A1C: CPT | Performed by: INTERNAL MEDICINE

## 2024-06-03 PROCEDURE — 82728 ASSAY OF FERRITIN: CPT

## 2024-06-03 PROCEDURE — 99214 OFFICE O/P EST MOD 30 MIN: CPT | Performed by: INTERNAL MEDICINE

## 2024-06-03 PROCEDURE — 83550 IRON BINDING TEST: CPT

## 2024-06-03 PROCEDURE — 1124F ACP DISCUSS-NO DSCNMKR DOCD: CPT | Performed by: INTERNAL MEDICINE

## 2024-06-03 PROCEDURE — G2211 COMPLEX E/M VISIT ADD ON: HCPCS | Performed by: INTERNAL MEDICINE

## 2024-06-03 PROCEDURE — 36415 COLL VENOUS BLD VENIPUNCTURE: CPT

## 2024-06-03 PROCEDURE — 3074F SYST BP LT 130 MM HG: CPT | Performed by: INTERNAL MEDICINE

## 2024-06-03 PROCEDURE — 83540 ASSAY OF IRON: CPT

## 2024-06-03 PROCEDURE — 83880 ASSAY OF NATRIURETIC PEPTIDE: CPT

## 2024-06-03 PROCEDURE — 85027 COMPLETE CBC AUTOMATED: CPT

## 2024-06-03 PROCEDURE — 80069 RENAL FUNCTION PANEL: CPT

## 2024-06-03 PROCEDURE — 84443 ASSAY THYROID STIM HORMONE: CPT

## 2024-06-03 RX ORDER — TORSEMIDE 20 MG/1
TABLET ORAL
COMMUNITY
Start: 2024-01-26 | End: 2024-06-03 | Stop reason: SDUPTHER

## 2024-06-03 RX ORDER — EMPAGLIFLOZIN 10 MG/1
TABLET, FILM COATED ORAL
COMMUNITY
Start: 2023-11-29 | End: 2024-06-03 | Stop reason: SINTOL

## 2024-06-03 RX ORDER — BLOOD-GLUCOSE CONTROL, NORMAL
1 EACH MISCELLANEOUS 3 TIMES DAILY
Qty: 30 EACH | Refills: 2 | Status: SHIPPED | OUTPATIENT
Start: 2024-06-03

## 2024-06-03 RX ORDER — SPIRONOLACTONE 25 MG/1
12.5 TABLET ORAL 2 TIMES DAILY
Qty: 90 TABLET | Refills: 3 | Status: SHIPPED | OUTPATIENT
Start: 2024-06-03

## 2024-06-03 RX ORDER — METFORMIN HYDROCHLORIDE 500 MG/1
1000 TABLET, EXTENDED RELEASE ORAL
Qty: 90 TABLET | Refills: 1 | Status: SHIPPED | OUTPATIENT
Start: 2024-06-03

## 2024-06-03 RX ORDER — ASPIRIN 81 MG/1
81 TABLET ORAL ONCE
Qty: 1 TABLET | Refills: 0 | Status: SHIPPED | OUTPATIENT
Start: 2024-06-03 | End: 2024-06-06 | Stop reason: SDUPTHER

## 2024-06-03 RX ORDER — ALENDRONATE SODIUM 70 MG/1
70 TABLET ORAL
Qty: 12 TABLET | Refills: 1 | Status: SHIPPED | OUTPATIENT
Start: 2024-06-03 | End: 2025-06-03

## 2024-06-03 RX ORDER — LEVOTHYROXINE SODIUM 88 UG/1
88 TABLET ORAL EVERY OTHER DAY
Qty: 45 TABLET | Refills: 0 | Status: SHIPPED | OUTPATIENT
Start: 2024-06-03 | End: 2024-06-06 | Stop reason: SDUPTHER

## 2024-06-03 RX ORDER — METOPROLOL SUCCINATE 25 MG/1
25 TABLET, EXTENDED RELEASE ORAL DAILY
Qty: 90 TABLET | Refills: 3 | Status: SHIPPED | OUTPATIENT
Start: 2024-06-03

## 2024-06-03 RX ORDER — PRAVASTATIN SODIUM 40 MG/1
40 TABLET ORAL NIGHTLY
Qty: 90 TABLET | Refills: 2 | Status: SHIPPED | OUTPATIENT
Start: 2024-06-03 | End: 2025-02-28

## 2024-06-03 RX ORDER — CHOLECALCIFEROL (VITAMIN D3) 50 MCG
2000 TABLET ORAL DAILY
Qty: 90 TABLET | Refills: 1 | Status: SHIPPED | OUTPATIENT
Start: 2024-06-03

## 2024-06-03 RX ORDER — BLOOD SUGAR DIAGNOSTIC
1 STRIP MISCELLANEOUS 3 TIMES DAILY
Qty: 100 STRIP | Refills: 2 | Status: SHIPPED | OUTPATIENT
Start: 2024-06-03

## 2024-06-03 RX ORDER — TORSEMIDE 20 MG/1
TABLET ORAL
Qty: 90 TABLET | Refills: 1 | Status: SHIPPED | OUTPATIENT
Start: 2024-06-03

## 2024-06-03 RX ORDER — ALBUTEROL SULFATE 90 UG/1
2 AEROSOL, METERED RESPIRATORY (INHALATION) EVERY 4 HOURS PRN
Qty: 18 G | Refills: 5 | Status: SHIPPED | OUTPATIENT
Start: 2024-06-03 | End: 2025-06-03

## 2024-06-03 ASSESSMENT — ENCOUNTER SYMPTOMS
NUMBNESS: 0
COUGH: 0
DYSURIA: 0
WOUND: 0
FLANK PAIN: 0
DIARRHEA: 0
CONFUSION: 0
SHORTNESS OF BREATH: 1
MYALGIAS: 0
FEVER: 0
VOMITING: 0
DIZZINESS: 0
CHILLS: 0
DIFFICULTY URINATING: 0
EYE DISCHARGE: 0
ABDOMINAL PAIN: 0
WHEEZING: 0
NAUSEA: 0

## 2024-06-03 NOTE — ASSESSMENT & PLAN NOTE
- Patient followed up with Cardiology recently and was planned to submit cardiac MRI, increase Entresto to 97/103 mg BID while continuing Metoprolol succinate 25 mg, Spironolactone 25 mg, Torsemide 20 mg. Did not tolerate SGLT2 inhibitors.

## 2024-06-03 NOTE — PROGRESS NOTES
I reviewed the resident/fellow's documentation and discussed the patient with the resident/fellow. I agree with the resident/fellow's medical decision making as documented in the note.  As the attending physician, I certify that I personally reviewed the patient's history and personally examined the patient to confirm the physical findings described above, and that I reviewed the relevant imaging studies and available reports. I also discussed the differential diagnosis and all of the proposed management plans with the patient and individuals accompanying the patient to this visit. They had the opportunity to ask questions about the proposed management plans and to have those questions answered.     Patience Yarbrough MD

## 2024-06-03 NOTE — PROGRESS NOTES
Subjective   Subjective:     History Of Present Illness:  Dana Corrigan is a 79 y.o. female with a PMH of HTN, HLD, DM2, hypothyroidism, osteoporosis, anemia, CAD, systolic-diastolic HF (EF 30-35%, 2022), who presents to [facility] [place] clinic for follow up visit. She states that she is feeling better regarding SOB and wheezing after starting Stiolto and Albuterol but still experiences some mild symptoms during exercises. Denies nocturnal nocturnal use/awakening.     Patient followed up with Cardiology recently and was planned to submit cardiac MRI, increase Entresto to 97/103 mg BID while continuing Metoprolol succinate 25 mg, Spironolactone 25 mg, Torsemide 20 mg. Did not tolerate SGLT2 inhibitors.    Patient followed up with Pulmonology and started Stiolto and Pulm rehab. She was also advised to follow up with Cardiology for repeat workup.    Patient followed up with Podiatry for imbalance and neuropathy and was recommended to diabetes management without any other interventions.    Past Medical History:  She has a past medical history of Body mass index (BMI) 19.9 or less, adult, Body mass index (BMI) 20.0-20.9, adult (04/15/2022), and Encounter for screening mammogram for malignant neoplasm of breast.    Past Surgical History:  She has a past surgical history that includes Total vaginal hysterectomy (06/16/2013) and Colonoscopy (02/22/2013).     Social History:  She reports that she quit smoking about 4 years ago. Her smoking use included cigarettes. She has never used smokeless tobacco. She reports that she does not currently use alcohol. She reports that she does not use drugs.    Family History:  Family History   Problem Relation Name Age of Onset   • Hypertension Mother     • Sarcoidosis Sister     • Breast cancer Other     • Colon cancer Other     • Diabetes Other         Allergies:  Jardiance [empagliflozin]    Home Medications:  (Not in a hospital admission)    Review Of Systems:  11-point ROS was performed and  is negative except as noted below and in the HPI.     Review of Systems   Constitutional:  Negative for chills and fever.   Eyes:  Negative for discharge.   Respiratory:  Positive for shortness of breath (exertional). Negative for cough and wheezing.    Cardiovascular:  Negative for chest pain and leg swelling.   Gastrointestinal:  Negative for abdominal pain, diarrhea, nausea and vomiting.   Genitourinary:  Negative for difficulty urinating, dysuria and flank pain.   Musculoskeletal:  Negative for myalgias.   Skin:  Negative for wound.   Neurological:  Negative for dizziness and numbness.   Psychiatric/Behavioral:  Negative for confusion.         Objective   Objective:     /82   Wt 55.3 kg (122 lb)   BMI 21.61 kg/m²     Physical Exam  Constitutional:       General: She is not in acute distress.     Appearance: Normal appearance.   HENT:      Head: Normocephalic and atraumatic.      Mouth/Throat:      Mouth: Mucous membranes are moist.   Eyes:      Conjunctiva/sclera: Conjunctivae normal.      Pupils: Pupils are equal, round, and reactive to light.   Cardiovascular:      Rate and Rhythm: Normal rate and regular rhythm.      Heart sounds: Normal heart sounds.   Pulmonary:      Effort: No respiratory distress.      Breath sounds: Normal breath sounds. No wheezing or rhonchi.   Abdominal:      General: Bowel sounds are normal.      Palpations: Abdomen is soft.      Tenderness: There is no abdominal tenderness.   Musculoskeletal:         General: No swelling.      Cervical back: Neck supple.   Skin:     General: Skin is warm and dry.   Neurological:      General: No focal deficit present.      Mental Status: She is alert.          Assessment & Plan:     Assessment/Plan     Problem List Items Addressed This Visit       Anemia    CAD (coronary artery disease)    Relevant Medications    metoprolol succinate XL (Toprol-XL) 25 mg 24 hr tablet    sacubitriL-valsartan (Entresto)  mg tablet    Chronic combined  systolic and diastolic heart failure, NYHA class 2 (Multi) - Primary     - Patient followed up with Cardiology recently and was planned to submit cardiac MRI, increase Entresto to 97/103 mg BID while continuing Metoprolol succinate 25 mg, Spironolactone 25 mg, Torsemide 20 mg. Did not tolerate SGLT2 inhibitors.         Relevant Medications    metoprolol succinate XL (Toprol-XL) 25 mg 24 hr tablet    sacubitriL-valsartan (Entresto)  mg tablet    Hyperlipidemia    Relevant Medications    pravastatin (Pravachol) 40 mg tablet    Hypothyroidism     - controlled with Levothyroxine 88 mcg every other day  - will check TSH with free T4 today  - used to be taking Levothyroxine 75 mcg in addition to 88 mcg every other other day but was discontinued         Relevant Medications    levothyroxine (Synthroid, Levoxyl) 88 mcg tablet    Other Relevant Orders    Tsh With Reflex To Free T4 If Abnormal    Osteoporosis    Relevant Medications    alendronate (Fosamax) 70 mg tablet    Vitamin D deficiency     - continue Vit D supplement         Relevant Medications    alendronate (Fosamax) 70 mg tablet    cholecalciferol (Vitamin D-3) 50 MCG (2000 UT) tablet    Type 2 diabetes mellitus (Multi)     - A1c 8.5% < 8.9%  - controlled with Metformin  - did not tolerate Jardiance         Relevant Medications    aspirin 81 mg EC tablet    lancets (OneTouch UltraSoft 2 Lancet) 30 gauge misc    metFORMIN  mg 24 hr tablet    OneTouch Ultra Test strip    Stage 3 chronic kidney disease, unspecified whether stage 3a or 3b CKD (Multi)     - creatinine at baseline         RESOLVED: Wheezing    Relevant Medications    albuterol (Ventolin HFA) 90 mcg/actuation inhaler    tiotropium-olodateroL (Stiolto Respimat) 2.5-2.5 mcg/actuation mist inhaler    Iron deficiency anemia     - follows up with heme/onc  - finished 2 iron injections  - will monitor with iron panel per heme/onc         WHO group 3 pulmonary arterial hypertension (Multi)     -  started Stiolto and Albuterol  - started pulm rehab per Pulmonology  - doing better          Other Visit Diagnoses       HFrEF (heart failure with reduced ejection fraction) (Multi)        Relevant Medications    aspirin 81 mg EC tablet    metoprolol succinate XL (Toprol-XL) 25 mg 24 hr tablet    sacubitriL-valsartan (Entresto)  mg tablet    spironolactone (Aldactone) 25 mg tablet    torsemide (Demadex) 20 mg tablet          #Health Maintenance:  - Follows up with Cardiology and Pulmonology for SOB and wheezing  - Follows up with Heme/Onc for anemia  - Needs cardiac MRI    Revisit Topics: Cardiac MRI, VERONICA, and SOB/wheezing.    Dispo: Patient is scheduled to return to clinic in September.    Armen Escobar DO, PGY-2  Internal Medicine     Disclaimer: Documentation completed with the information available at the time of input. The times in the chart may not be reflective of actual patient care times, interventions, or procedures. Documentation occurs after the physical care of the patient.

## 2024-06-03 NOTE — ASSESSMENT & PLAN NOTE
- follows up with heme/onc  - finished 2 iron injections  - will monitor with iron panel per heme/onc

## 2024-06-03 NOTE — ASSESSMENT & PLAN NOTE
- controlled with Levothyroxine 88 mcg every other day  - will check TSH with free T4 today  - used to be taking Levothyroxine 75 mcg in addition to 88 mcg every other other day but was discontinued

## 2024-06-04 ENCOUNTER — CLINICAL SUPPORT (OUTPATIENT)
Dept: CARDIAC REHAB | Facility: CLINIC | Age: 79
End: 2024-06-04
Payer: MEDICARE

## 2024-06-04 DIAGNOSIS — J43.8 OTHER EMPHYSEMA (MULTI): ICD-10-CM

## 2024-06-04 LAB — BNP SERPL-MCNC: 86 PG/ML (ref 0–99)

## 2024-06-04 PROCEDURE — 94626 PHY/QHP OP PULM RHB W/MNTR: CPT | Performed by: INTERNAL MEDICINE

## 2024-06-06 ENCOUNTER — CLINICAL SUPPORT (OUTPATIENT)
Dept: CARDIAC REHAB | Facility: CLINIC | Age: 79
End: 2024-06-06
Payer: MEDICARE

## 2024-06-06 DIAGNOSIS — J43.8 OTHER EMPHYSEMA (MULTI): ICD-10-CM

## 2024-06-06 DIAGNOSIS — E06.3 HYPOTHYROIDISM DUE TO HASHIMOTO'S THYROIDITIS: ICD-10-CM

## 2024-06-06 DIAGNOSIS — E11.9 TYPE 2 DIABETES MELLITUS WITHOUT COMPLICATION, WITHOUT LONG-TERM CURRENT USE OF INSULIN (MULTI): ICD-10-CM

## 2024-06-06 DIAGNOSIS — E03.8 HYPOTHYROIDISM DUE TO HASHIMOTO'S THYROIDITIS: ICD-10-CM

## 2024-06-06 DIAGNOSIS — I50.20 HFREF (HEART FAILURE WITH REDUCED EJECTION FRACTION) (MULTI): ICD-10-CM

## 2024-06-06 PROCEDURE — 94626 PHY/QHP OP PULM RHB W/MNTR: CPT | Performed by: INTERNAL MEDICINE

## 2024-06-06 RX ORDER — ASPIRIN 81 MG/1
81 TABLET ORAL DAILY
Qty: 90 TABLET | Refills: 0 | Status: SHIPPED | OUTPATIENT
Start: 2024-06-06

## 2024-06-06 RX ORDER — LEVOTHYROXINE SODIUM 88 UG/1
88 TABLET ORAL EVERY OTHER DAY
Qty: 45 TABLET | Refills: 0 | Status: SHIPPED | OUTPATIENT
Start: 2024-06-06 | End: 2024-09-04

## 2024-06-07 NOTE — PROGRESS NOTES
Pulmonary Rehabilitation 30 Day Reassessment    Name: Dana Corrigan  Medical Record Number: [id number]  YOB: 1945  Age: 79 y.o.    Today’s Date: 6/7/2024  Primary Care Physician: Patience Yarbrough MD  Referring Physician: Von Jeffries DO  Program Location: [address]     General  Primary Diagnosis:   1. Other emphysema (Multi)  Follow Up In Pulmonary Rehabilitation         Onset/Date of Diagnosis: J43.2/ 2/28/24    In Progress    AACVPR Risk Stratification:       Falls Risk: High  Psychosocial Assessment    No data recorded    Sent PH-Q 9 to MD if score > 20: No; score < 20    Stress Management    Pt reported/currently experiencing stress: No  Patient uses stress management skills: No   History of: no history of anxiety or depression  Currently seeing a mental health provider: No  Social Support: Yes, Whom: Daughter   Pre CAT score: 14    Post CAT score: Survey to be given at discharge.   Learning Assessment:  Learning assessment/barriers: None  Preferred learning method:  Combination  Barriers: Hearing problems  Comments:    Stages of Change:Action    Psychosocial Plan    Goal Status: In progress    Psychosocial Goals: Maintain or lower PH-Q 9 score by discharge    Psychosocial Interventions/Education:  To be done in pulmonary rehab     Psychosocial Reassessment: To be completed at discharge     Oxygen Assessment    SpO2 at rest: 96 %  SpO2 with exertion: 964%    Oxygen Use    Supplemental O2 prescribed: No,   Liters at rest: Room Air  Liters with exertion: Room Air    SpO2 at rest: 96%  SpO2 with exertion: 964%      Hypoxia managed: Yes   Home Pulse Oximeter: Yes     Pre MMRC: 4  Post MMRC: NA    Oxygen Plan  Goal Status: In progress  Oxygen Goals: Decrease MMRC score, Learn and use diaphragmatic breathing as needed, and Learn and use pursed lip breathing as needed    Oxygen Education/Interventions:   To be done in Pulmonary Rehab.    Nutrition Assessment:    Hyperlipidemia: No      Lipids:   Lab Results   Component Value Date    CHOL 221 (H) 10/30/2023    HDL 58.1 10/30/2023    LDLF 78 07/11/2023    TRIG 177 (H) 10/30/2023       Current Dietary Guidelines: Low sodium, Diabetic   Barriers to dietary change: no    Diet Habit Survey: Picture Your Plate  Pre:  60  Post: To be done at discharge.    Diabetes Assessment    Lab Results   Component Value Date    HGBA1C 8.5 (A) 06/03/2024       History of Diabetes: Yes Pt monitors BS at home: No  Pt said that she is starting to check them more often. Told pt I need her to check pre workout.  Frequency: NA /day  FBS range: AIC 9.2 - Estimated average blood glucose  Hypoglycemic Episodes: No     Weight Management       No data recorded    Nutrition Plan    Goal Status: In progress    Nutrition Goals: Maintain body weight    Nutrition Interventions/Education:   To be done in pulmonary rehab    Nutrition Reassessment: Meet with dietician on 5/28/24    Exercise Assessment    No  Mode: NA  Frequency: NA  Duration: NA    6 Minute Walk Assessment     6 MWT distance:   300 ft. Pt stopped at 3 minutes and did not want to walk anymore due to leg pain.    FiO2 used during test: RA Liters    Exercise Prescription      Exercise Prescription based on: 6 Minute Walk Test          Frequency:  2 days/week   Mode: Treadmill, NuStep, and Recumbent Cycle   Duration: 30-45 total aerobic minutes   Intensity: RPE 10-12  Target HR:     MET Level: 1.1-2.5  Patient wears supplemental O2: No     Modality Workload METs Duration (minutes)   1 Pre-Exercise      2 Treadmill      3 NuStep 1  2.5 24-30   4 Recumbent Bike      5         6 Post-Exercise        Resistance Training: No   Home Exercise Prescription given: To be given prior to discharge from program.    Exercise Plan    Goal Status: In progress    Exercise Goals: Obtain 150 minutes/week of moderate intensity aerobic exercise, Initiate strength training 2-3 days a week, and Establish a home exercise program before  discharge    Exercise Interventions/Education:   To be done in pulmonary rehab     Exercise Reassessment: Completed ever 30 days. Pt limited to NS  at this time.     Other Core Components/Risk Factor Assessment:    Medication adherence  Current Medications:   Medication Documentation Review Audit       Reviewed by Ashley Salcedo RN (Registered Nurse) on 05/07/24 at 1123      Medication Order Taking? Sig Documenting Provider Last Dose Status   albuterol (Ventolin HFA) 90 mcg/actuation inhaler [id number] Yes Inhale 2 puffs every 4 hours if needed for wheezing or shortness of breath. Von Jeffries,  Taking Active   alendronate (Fosamax) 70 mg tablet [id number] Yes TAKE 1 TABLET (70 MG) BY MOUTH EVERY 7 DAYS. TAKE IN THE MORNING WITH A FULL GLASS OF WATER, ON AN EMPTY STOMACH, AND DO NOT TAKE ANYTHING ELSE BY MOUTH OR LIE DOWN FOR THE NEXT 30 MIN. Patience Yarbrough MD Taking Active   aspirin 81 mg EC tablet [id number] Yes Take 1 tablet (81 mg) by mouth once daily. Historical Provider, MD Taking Active   blood-glucose meter (OneTouch Ultra2 Meter) misc [id number] Yes 1 Units 3 times a day. Armen Escobar DO Taking Active   cholecalciferol (Vitamin D-3) 50 MCG (2000 UT) tablet [id number] Yes See administration instructions. Historical Provider, MD Taking Active   lancets (OneTouch UltraSoft 2 Lancet) 30 gauge misc [id number] Yes 1 Lancet 3 times a day. Armen Escobar DO Taking Active   levothyroxine (Synthroid, Levoxyl) 88 mcg tablet [id number] Yes Take 1 tablet (88 mcg) by mouth every other day. Patience Yarbrough MD Taking Active   metFORMIN XR (Glucophage-XR) 500 mg 24 hr tablet [id number] Yes Take 2 tablets (1,000 mg) by mouth once daily in the evening. Take with meals. Historical Provider, MD Taking Active   metoprolol succinate XL (Toprol-XL) 25 mg 24 hr tablet [id number] Yes TAKE 1 TABLET BY MOUTH EVERY DAY Patience Yarbrough MD Taking Active   OneTouch Ultra Test strip [id number] Yes 1 STRIP 3 TIMES A DAY. Patience SPANN  MD Linnea Taking Active   pravastatin (Pravachol) 40 mg tablet [id number] Yes Take 1 tablet (40 mg) by mouth once daily at bedtime. Armen Escobar DO Taking Active   sacubitriL-valsartan (Entresto) 49-51 mg tablet [id number] Yes Take 1 tablet by mouth 2 times a day. Daksha Henderson MD PhD Taking Active   spironolactone (Aldactone) 25 mg tablet [id number] Yes Take 0.5 tablets (12.5 mg) by mouth twice a day. Historical Provider, MD Taking Active   tiotropium-olodateroL (Stiolto Respimat) 2.5-2.5 mcg/actuation mist inhaler [id number] Yes Inhale 2 Inhalations once daily. Von Jeffries DO Taking Active                                 Medication compliance: Yes   Uses pill box/organizer: No    Carries medication list: No    Uses Inhalers appropriately: Yes     Blood Pressure Management  History of Hypertension: Yes   Medication Changes: No   Resting BP: 130/70    Heart Failure Management  Hx of Heart Failure: Yes;   Type (selection): HFrEF  Most recent EF:  76 %     Onset of heart failure diagnosis:   Last heart failure hospitalization: 10/21  Number of HF admissions per year:     Symptoms: Fatigue with exertion, Shortness of breath, and LE edema  Is there a family Hx of HF:     Does patient obtain daily weight       KCCQ survey: Pre:   Post:     Heart Failure Reassessment: Initial Treatment Plan. Will reassess in 30 days.    Heart Failure Goals: Able to verbalize signs and symptoms of fluid retention and when to contact MD    Smoking/Tobacco Assessment  Social History     Tobacco Use   Smoking Status Former    Current packs/day: 0.00    Types: Cigarettes    Quit date: 2020    Years since quittin.4   Smokeless Tobacco Never       Other Core Component Plan    Goal Status: In progress    Other Core Component Goals:  Hypertension    Other Core Component Interventions/Education:   Achieve resting BP of < 130/80 by discharge    Initial Assessment: NA    Individual Patient Goals:    Breath Better  Become More  conditioned    Goal Status: In progress    Education:   Meet with dietician to discuss pulmonary nutritrion- handout and lecture- 5/28/24  Pulmonary A&P: handout and lecture- 6/4/24  Pathophysiology of Chronic Lung Disease- handout and lecture - 6/4/24    Staff Comments: Pt with hx of COPD on RA. Pt has been to a total of 6 sessions at pulmonary rehab. Pt has been able to complete 24 minutes of exercise on the NS. Will increase pt to 26 minutes. Pt is limited to NS.     Rehab Staff Signature: HEBERT LEARY, RRT

## 2024-06-11 ENCOUNTER — APPOINTMENT (OUTPATIENT)
Dept: CARDIAC REHAB | Facility: CLINIC | Age: 79
End: 2024-06-11
Payer: MEDICARE

## 2024-06-13 ENCOUNTER — CLINICAL SUPPORT (OUTPATIENT)
Dept: CARDIAC REHAB | Facility: CLINIC | Age: 79
End: 2024-06-13
Payer: MEDICARE

## 2024-06-13 DIAGNOSIS — J43.8 OTHER EMPHYSEMA (MULTI): ICD-10-CM

## 2024-06-13 PROCEDURE — 94626 PHY/QHP OP PULM RHB W/MNTR: CPT | Performed by: INTERNAL MEDICINE

## 2024-06-18 ENCOUNTER — APPOINTMENT (OUTPATIENT)
Dept: CARDIAC REHAB | Facility: CLINIC | Age: 79
End: 2024-06-18
Payer: MEDICARE

## 2024-06-20 ENCOUNTER — APPOINTMENT (OUTPATIENT)
Dept: CARDIAC REHAB | Facility: CLINIC | Age: 79
End: 2024-06-20
Payer: MEDICARE

## 2024-06-25 ENCOUNTER — CLINICAL SUPPORT (OUTPATIENT)
Dept: CARDIAC REHAB | Facility: CLINIC | Age: 79
End: 2024-06-25
Payer: MEDICARE

## 2024-06-25 DIAGNOSIS — J43.8 OTHER EMPHYSEMA (MULTI): ICD-10-CM

## 2024-06-25 PROCEDURE — 94626 PHY/QHP OP PULM RHB W/MNTR: CPT

## 2024-06-27 ENCOUNTER — CLINICAL SUPPORT (OUTPATIENT)
Dept: CARDIAC REHAB | Facility: CLINIC | Age: 79
End: 2024-06-27
Payer: MEDICARE

## 2024-06-27 DIAGNOSIS — J43.8 OTHER EMPHYSEMA (MULTI): ICD-10-CM

## 2024-06-27 PROCEDURE — 94626 PHY/QHP OP PULM RHB W/MNTR: CPT

## 2024-07-02 ENCOUNTER — CLINICAL SUPPORT (OUTPATIENT)
Dept: CARDIAC REHAB | Facility: CLINIC | Age: 79
End: 2024-07-02
Payer: MEDICARE

## 2024-07-02 DIAGNOSIS — J43.8 OTHER EMPHYSEMA (MULTI): ICD-10-CM

## 2024-07-02 PROCEDURE — 94626 PHY/QHP OP PULM RHB W/MNTR: CPT | Performed by: INTERNAL MEDICINE

## 2024-07-09 ENCOUNTER — APPOINTMENT (OUTPATIENT)
Dept: CARDIAC REHAB | Facility: CLINIC | Age: 79
End: 2024-07-09
Payer: MEDICARE

## 2024-07-11 ENCOUNTER — APPOINTMENT (OUTPATIENT)
Dept: CARDIAC REHAB | Facility: CLINIC | Age: 79
End: 2024-07-11
Payer: MEDICARE

## 2024-07-16 ENCOUNTER — CLINICAL SUPPORT (OUTPATIENT)
Dept: CARDIAC REHAB | Facility: CLINIC | Age: 79
End: 2024-07-16
Payer: MEDICARE

## 2024-07-16 DIAGNOSIS — J43.8 OTHER EMPHYSEMA (MULTI): ICD-10-CM

## 2024-07-16 PROCEDURE — 94626 PHY/QHP OP PULM RHB W/MNTR: CPT

## 2024-07-18 ENCOUNTER — CLINICAL SUPPORT (OUTPATIENT)
Dept: CARDIAC REHAB | Facility: CLINIC | Age: 79
End: 2024-07-18
Payer: MEDICARE

## 2024-07-18 DIAGNOSIS — J43.8 OTHER EMPHYSEMA (MULTI): ICD-10-CM

## 2024-07-18 PROCEDURE — 94626 PHY/QHP OP PULM RHB W/MNTR: CPT

## 2024-07-23 ENCOUNTER — APPOINTMENT (OUTPATIENT)
Dept: CARDIAC REHAB | Facility: CLINIC | Age: 79
End: 2024-07-23
Payer: MEDICARE

## 2024-07-25 ENCOUNTER — CLINICAL SUPPORT (OUTPATIENT)
Dept: CARDIAC REHAB | Facility: CLINIC | Age: 79
End: 2024-07-25
Payer: MEDICARE

## 2024-07-25 DIAGNOSIS — J43.8 OTHER EMPHYSEMA (MULTI): ICD-10-CM

## 2024-07-25 PROCEDURE — 94626 PHY/QHP OP PULM RHB W/MNTR: CPT | Performed by: INTERNAL MEDICINE

## 2024-07-30 ENCOUNTER — APPOINTMENT (OUTPATIENT)
Dept: CARDIAC REHAB | Facility: CLINIC | Age: 79
End: 2024-07-30
Payer: MEDICARE

## 2024-08-01 ENCOUNTER — APPOINTMENT (OUTPATIENT)
Dept: CARDIAC REHAB | Facility: CLINIC | Age: 79
End: 2024-08-01
Payer: MEDICARE

## 2024-08-06 ENCOUNTER — CLINICAL SUPPORT (OUTPATIENT)
Dept: CARDIAC REHAB | Facility: CLINIC | Age: 79
End: 2024-08-06
Payer: MEDICARE

## 2024-08-06 DIAGNOSIS — J43.8 OTHER EMPHYSEMA (MULTI): ICD-10-CM

## 2024-08-06 PROCEDURE — 94626 PHY/QHP OP PULM RHB W/MNTR: CPT | Performed by: INTERNAL MEDICINE

## 2024-08-08 ENCOUNTER — APPOINTMENT (OUTPATIENT)
Dept: CARDIAC REHAB | Facility: CLINIC | Age: 79
End: 2024-08-08
Payer: MEDICARE

## 2024-08-13 ENCOUNTER — CLINICAL SUPPORT (OUTPATIENT)
Dept: CARDIAC REHAB | Facility: CLINIC | Age: 79
End: 2024-08-13
Payer: MEDICARE

## 2024-08-13 ENCOUNTER — APPOINTMENT (OUTPATIENT)
Dept: OPHTHALMOLOGY | Facility: CLINIC | Age: 79
End: 2024-08-13
Payer: MEDICARE

## 2024-08-13 DIAGNOSIS — H04.123 DRY EYES: ICD-10-CM

## 2024-08-13 DIAGNOSIS — H52.03 HYPEROPIA OF BOTH EYES: ICD-10-CM

## 2024-08-13 DIAGNOSIS — H52.4 PRESBYOPIA: ICD-10-CM

## 2024-08-13 DIAGNOSIS — H52.223 REGULAR ASTIGMATISM OF BOTH EYES: ICD-10-CM

## 2024-08-13 DIAGNOSIS — E11.9 TYPE 2 DIABETES MELLITUS WITHOUT COMPLICATION, WITHOUT LONG-TERM CURRENT USE OF INSULIN (MULTI): Primary | ICD-10-CM

## 2024-08-13 DIAGNOSIS — H02.401 PTOSIS OF RIGHT EYELID: ICD-10-CM

## 2024-08-13 DIAGNOSIS — H53.8 BLURRED VISION: ICD-10-CM

## 2024-08-13 DIAGNOSIS — J43.8 OTHER EMPHYSEMA (MULTI): ICD-10-CM

## 2024-08-13 PROCEDURE — 92004 COMPRE OPH EXAM NEW PT 1/>: CPT | Performed by: OPHTHALMOLOGY

## 2024-08-13 PROCEDURE — 92015 DETERMINE REFRACTIVE STATE: CPT | Performed by: OPHTHALMOLOGY

## 2024-08-13 ASSESSMENT — REFRACTION_MANIFEST
OS_CYLINDER: -1.00
OD_SPHERE: +0.25
OS_SPHERE: +0.25
OD_CYLINDER: -0.75
OS_SPHERE: +0.25
OS_CYLINDER: -0.25
OD_AXIS: 100
METHOD_AUTOREFRACTION: 1
OD_AXIS: 165
OD_CYLINDER: -1.00
OS_ADD: +2.75
OD_SPHERE: +0.00
OS_AXIS: 100
OS_AXIS: 080
OD_ADD: +2.75

## 2024-08-13 ASSESSMENT — CUP TO DISC RATIO
OD_RATIO: 0.3
OS_RATIO: 0.3

## 2024-08-13 ASSESSMENT — SLIT LAMP EXAM - LIDS
COMMENTS: NORMAL
COMMENTS: 2+ PTOSIS

## 2024-08-13 ASSESSMENT — REFRACTION_WEARINGRX
OS_AXIS: 100
OS_SPHERE: +0.25
OD_SPHERE: +0.50
OD_ADD: +2.50
OS_CYLINDER: -1.00
OD_AXIS: 095
OD_CYLINDER: -1.00
OS_ADD: +2.50

## 2024-08-13 ASSESSMENT — TONOMETRY
OD_IOP_MMHG: 19
OS_IOP_MMHG: 20
IOP_METHOD: GOLDMANN APPLANATION

## 2024-08-13 ASSESSMENT — VISUAL ACUITY
OD_CC: 20/30
METHOD: SNELLEN - LINEAR
OS_CC: 20/25-2

## 2024-08-13 ASSESSMENT — EXTERNAL EXAM - RIGHT EYE: OD_EXAM: NORMAL

## 2024-08-13 ASSESSMENT — EXTERNAL EXAM - LEFT EYE: OS_EXAM: NORMAL

## 2024-08-13 NOTE — PROGRESS NOTES
Assessment/Plan   Diagnoses and all orders for this visit:  Type 2 diabetes mellitus without complication, without long-term current use of insulin (Multi)  -no evidence of diabetic retinopathy at the present time  -pt was advised of the importance of good diabetes control and the importance of a yearly dilated diabetic exam    Blurred vision  Improved with refraction    Dry eyes  -Start artificial tears both eyes (OU) qid  -stress compliance    Ptosis of right eyelid  -refer to Dr. Zhu for evaluation and management    Hyperopia of both eyes  Regular astigmatism of both eyes  Presbyopia  Refractive error  -give Rx for new glasses    Return for a dilated exam in   12  months or sooner if having any problems

## 2024-08-15 ENCOUNTER — CLINICAL SUPPORT (OUTPATIENT)
Dept: CARDIAC REHAB | Facility: CLINIC | Age: 79
End: 2024-08-15
Payer: MEDICARE

## 2024-08-15 DIAGNOSIS — J43.8 OTHER EMPHYSEMA (MULTI): ICD-10-CM

## 2024-08-20 ENCOUNTER — APPOINTMENT (OUTPATIENT)
Dept: CARDIAC REHAB | Facility: CLINIC | Age: 79
End: 2024-08-20
Payer: MEDICARE

## 2024-08-22 ENCOUNTER — APPOINTMENT (OUTPATIENT)
Dept: CARDIAC REHAB | Facility: CLINIC | Age: 79
End: 2024-08-22
Payer: MEDICARE

## 2024-08-27 ENCOUNTER — APPOINTMENT (OUTPATIENT)
Dept: CARDIAC REHAB | Facility: CLINIC | Age: 79
End: 2024-08-27
Payer: MEDICARE

## 2024-08-29 ENCOUNTER — APPOINTMENT (OUTPATIENT)
Dept: CARDIAC REHAB | Facility: CLINIC | Age: 79
End: 2024-08-29
Payer: MEDICARE

## 2024-09-03 ENCOUNTER — CLINICAL SUPPORT (OUTPATIENT)
Dept: CARDIAC REHAB | Facility: CLINIC | Age: 79
End: 2024-09-03
Payer: MEDICARE

## 2024-09-03 DIAGNOSIS — J43.8 OTHER EMPHYSEMA (MULTI): ICD-10-CM

## 2024-09-03 PROCEDURE — 94626 PHY/QHP OP PULM RHB W/MNTR: CPT | Performed by: INTERNAL MEDICINE

## 2024-09-05 ENCOUNTER — APPOINTMENT (OUTPATIENT)
Dept: CARDIAC REHAB | Facility: CLINIC | Age: 79
End: 2024-09-05
Payer: MEDICARE

## 2024-09-10 ENCOUNTER — APPOINTMENT (OUTPATIENT)
Dept: CARDIAC REHAB | Facility: CLINIC | Age: 79
End: 2024-09-10
Payer: MEDICARE

## 2024-09-12 ENCOUNTER — APPOINTMENT (OUTPATIENT)
Dept: CARDIAC REHAB | Facility: CLINIC | Age: 79
End: 2024-09-12
Payer: MEDICARE

## 2024-09-17 ENCOUNTER — APPOINTMENT (OUTPATIENT)
Dept: CARDIAC REHAB | Facility: CLINIC | Age: 79
End: 2024-09-17
Payer: MEDICARE

## 2024-09-19 ENCOUNTER — APPOINTMENT (OUTPATIENT)
Dept: CARDIAC REHAB | Facility: CLINIC | Age: 79
End: 2024-09-19
Payer: MEDICARE

## 2024-09-23 ENCOUNTER — APPOINTMENT (OUTPATIENT)
Dept: PRIMARY CARE | Facility: CLINIC | Age: 79
End: 2024-09-23
Payer: MEDICARE

## 2024-09-23 ENCOUNTER — TELEPHONE (OUTPATIENT)
Dept: HOME HEALTH SERVICES | Facility: HOME HEALTH | Age: 79
End: 2024-09-23

## 2024-09-23 ENCOUNTER — DOCUMENTATION (OUTPATIENT)
Dept: HOME HEALTH SERVICES | Facility: HOME HEALTH | Age: 79
End: 2024-09-23

## 2024-09-23 VITALS
SYSTOLIC BLOOD PRESSURE: 171 MMHG | HEIGHT: 63 IN | DIASTOLIC BLOOD PRESSURE: 85 MMHG | HEART RATE: 83 BPM | BODY MASS INDEX: 22.5 KG/M2 | WEIGHT: 127 LBS

## 2024-09-23 DIAGNOSIS — I25.810 CORONARY ARTERY DISEASE INVOLVING CORONARY BYPASS GRAFT, UNSPECIFIED WHETHER ANGINA PRESENT, UNSPECIFIED WHETHER NATIVE OR TRANSPLANTED HEART: ICD-10-CM

## 2024-09-23 DIAGNOSIS — D64.9 ANEMIA, UNSPECIFIED TYPE: ICD-10-CM

## 2024-09-23 DIAGNOSIS — K21.9 GASTROESOPHAGEAL REFLUX DISEASE WITHOUT ESOPHAGITIS: ICD-10-CM

## 2024-09-23 DIAGNOSIS — I25.5 ISCHEMIC DILATED CARDIOMYOPATHY (MULTI): ICD-10-CM

## 2024-09-23 DIAGNOSIS — E78.5 HYPERLIPIDEMIA, UNSPECIFIED HYPERLIPIDEMIA TYPE: ICD-10-CM

## 2024-09-23 DIAGNOSIS — E89.0 POSTOPERATIVE HYPOTHYROIDISM: ICD-10-CM

## 2024-09-23 DIAGNOSIS — I10 ESSENTIAL HYPERTENSION: ICD-10-CM

## 2024-09-23 DIAGNOSIS — Z11.4 ENCOUNTER FOR SCREENING FOR HIV: ICD-10-CM

## 2024-09-23 DIAGNOSIS — I50.42 CHRONIC COMBINED SYSTOLIC AND DIASTOLIC HEART FAILURE, NYHA CLASS 2: Primary | ICD-10-CM

## 2024-09-23 DIAGNOSIS — I10 PRIMARY HYPERTENSION: ICD-10-CM

## 2024-09-23 DIAGNOSIS — F51.01 PRIMARY INSOMNIA: ICD-10-CM

## 2024-09-23 DIAGNOSIS — E11.9 TYPE 2 DIABETES MELLITUS WITHOUT COMPLICATION, WITHOUT LONG-TERM CURRENT USE OF INSULIN (MULTI): ICD-10-CM

## 2024-09-23 DIAGNOSIS — Z11.59 ENCOUNTER FOR HEPATITIS C SCREENING TEST FOR LOW RISK PATIENT: ICD-10-CM

## 2024-09-23 DIAGNOSIS — R06.02 SHORTNESS OF BREATH ON EXERTION: ICD-10-CM

## 2024-09-23 DIAGNOSIS — I50.30 HEART FAILURE WITH PRESERVED LEFT VENTRICULAR FUNCTION (HFPEF): ICD-10-CM

## 2024-09-23 DIAGNOSIS — E11.9 TYPE 2 DIABETES MELLITUS WITHOUT COMPLICATION, UNSPECIFIED WHETHER LONG TERM INSULIN USE (MULTI): ICD-10-CM

## 2024-09-23 DIAGNOSIS — D63.1 ANEMIA OF RENAL DISEASE: ICD-10-CM

## 2024-09-23 DIAGNOSIS — K30 ACID INDIGESTION: ICD-10-CM

## 2024-09-23 DIAGNOSIS — J45.909 ASTHMA, UNSPECIFIED ASTHMA SEVERITY, UNSPECIFIED WHETHER COMPLICATED, UNSPECIFIED WHETHER PERSISTENT (HHS-HCC): ICD-10-CM

## 2024-09-23 DIAGNOSIS — I25.738: ICD-10-CM

## 2024-09-23 DIAGNOSIS — K64.9 HEMORRHOIDS, UNSPECIFIED HEMORRHOID TYPE: ICD-10-CM

## 2024-09-23 DIAGNOSIS — I50.32 CHRONIC HEART FAILURE WITH PRESERVED EJECTION FRACTION: ICD-10-CM

## 2024-09-23 DIAGNOSIS — Z86.2 HISTORY OF THROMBOTIC THROMBOCYTOPENIC PURPURA: ICD-10-CM

## 2024-09-23 DIAGNOSIS — E03.9 HYPOTHYROIDISM, UNSPECIFIED TYPE: ICD-10-CM

## 2024-09-23 DIAGNOSIS — E11.69 TYPE 2 DIABETES MELLITUS WITH OTHER SPECIFIED COMPLICATION, UNSPECIFIED WHETHER LONG TERM INSULIN USE (MULTI): ICD-10-CM

## 2024-09-23 DIAGNOSIS — N18.9 ANEMIA OF RENAL DISEASE: ICD-10-CM

## 2024-09-23 DIAGNOSIS — I63.412 CEREBROVASCULAR ACCIDENT (CVA) DUE TO EMBOLISM OF LEFT MIDDLE CEREBRAL ARTERY (MULTI): ICD-10-CM

## 2024-09-23 DIAGNOSIS — E55.9 VITAMIN D DEFICIENCY: ICD-10-CM

## 2024-09-23 DIAGNOSIS — I42.0 ISCHEMIC DILATED CARDIOMYOPATHY (MULTI): ICD-10-CM

## 2024-09-23 DIAGNOSIS — Z12.31 ENCOUNTER FOR SCREENING MAMMOGRAM FOR MALIGNANT NEOPLASM OF BREAST: ICD-10-CM

## 2024-09-23 DIAGNOSIS — R11.2 NAUSEA AND VOMITING, UNSPECIFIED VOMITING TYPE: ICD-10-CM

## 2024-09-23 DIAGNOSIS — F41.9 ANXIETY: ICD-10-CM

## 2024-09-23 DIAGNOSIS — F17.210 CIGARETTE NICOTINE DEPENDENCE WITHOUT COMPLICATION: ICD-10-CM

## 2024-09-23 DIAGNOSIS — M79.7 FIBROMYALGIA: ICD-10-CM

## 2024-09-23 DIAGNOSIS — N60.02 CYST OF LEFT BREAST: ICD-10-CM

## 2024-09-23 DIAGNOSIS — R26.9 GAIT DISORDER: ICD-10-CM

## 2024-09-23 DIAGNOSIS — R06.09 DYSPNEA ON EXERTION: ICD-10-CM

## 2024-09-23 DIAGNOSIS — F11.20 OPIOID TYPE DEPENDENCE, CONTINUOUS (MULTI): ICD-10-CM

## 2024-09-23 DIAGNOSIS — R51.9 INTRACTABLE HEADACHE, UNSPECIFIED CHRONICITY PATTERN, UNSPECIFIED HEADACHE TYPE: ICD-10-CM

## 2024-09-23 DIAGNOSIS — E83.51 HYPOCALCEMIA: ICD-10-CM

## 2024-09-23 DIAGNOSIS — J44.9 CHRONIC OBSTRUCTIVE PULMONARY DISEASE, UNSPECIFIED COPD TYPE (MULTI): ICD-10-CM

## 2024-09-23 DIAGNOSIS — N18.30 STAGE 3 CHRONIC KIDNEY DISEASE, UNSPECIFIED WHETHER STAGE 3A OR 3B CKD (MULTI): ICD-10-CM

## 2024-09-23 LAB — POC HEMOGLOBIN A1C: 7.2 % (ref 4.2–6.5)

## 2024-09-23 PROCEDURE — G2211 COMPLEX E/M VISIT ADD ON: HCPCS | Performed by: INTERNAL MEDICINE

## 2024-09-23 PROCEDURE — 99214 OFFICE O/P EST MOD 30 MIN: CPT | Performed by: INTERNAL MEDICINE

## 2024-09-23 PROCEDURE — 3077F SYST BP >= 140 MM HG: CPT | Performed by: INTERNAL MEDICINE

## 2024-09-23 PROCEDURE — 83036 HEMOGLOBIN GLYCOSYLATED A1C: CPT | Performed by: INTERNAL MEDICINE

## 2024-09-23 PROCEDURE — 3079F DIAST BP 80-89 MM HG: CPT | Performed by: INTERNAL MEDICINE

## 2024-09-23 PROCEDURE — 1036F TOBACCO NON-USER: CPT | Performed by: INTERNAL MEDICINE

## 2024-09-23 ASSESSMENT — ENCOUNTER SYMPTOMS
NAUSEA: 0
COUGH: 0
CHILLS: 0
NUMBNESS: 0
EYE DISCHARGE: 0
WOUND: 0
FEVER: 0
WHEEZING: 1
MYALGIAS: 0
ABDOMINAL PAIN: 0
SHORTNESS OF BREATH: 1
DIFFICULTY URINATING: 0
CONFUSION: 0
FLANK PAIN: 0
VOMITING: 0
DIARRHEA: 0
DYSURIA: 0
DIZZINESS: 0

## 2024-09-23 ASSESSMENT — LIFESTYLE VARIABLES
AUDIT-C TOTAL SCORE: 0
HOW MANY STANDARD DRINKS CONTAINING ALCOHOL DO YOU HAVE ON A TYPICAL DAY: PATIENT DOES NOT DRINK
SKIP TO QUESTIONS 9-10: 1
HOW OFTEN DO YOU HAVE SIX OR MORE DRINKS ON ONE OCCASION: NEVER
HOW OFTEN DO YOU HAVE A DRINK CONTAINING ALCOHOL: NEVER

## 2024-09-23 ASSESSMENT — PATIENT HEALTH QUESTIONNAIRE - PHQ9
SUM OF ALL RESPONSES TO PHQ9 QUESTIONS 1 AND 2: 0
1. LITTLE INTEREST OR PLEASURE IN DOING THINGS: NOT AT ALL
2. FEELING DOWN, DEPRESSED OR HOPELESS: NOT AT ALL

## 2024-09-23 NOTE — ASSESSMENT & PLAN NOTE
Likely due to COPD and combination with heart failure.  Continue follow-up with cardiology and pulmonology.

## 2024-09-23 NOTE — ASSESSMENT & PLAN NOTE
PFT significant for COPD per pulmonology evaluation  Continue Stiolto as instructed  Follow-up with pulmonology

## 2024-09-23 NOTE — TELEPHONE ENCOUNTER
Unfortunately, [facility] does not provide non-skilled services. Due to lack of skilled needs, we have made the referral for [Pt Name & MRN] a Non-Admit. If the patient requires skilled services (SN, PT, ST), please resubmit the referral indicating the patient's needs. Other services (OT, HHA, MSW, RD) can be added if one of these qualifying services are ordered. Thank you.

## 2024-09-23 NOTE — PROGRESS NOTES
Subjective   Subjective:     History Of Present Illness:  Lisset Corrigan is a 79 y.o. female with a PMH of PMH of HTN, HLD, DM2, hypothyroidism, osteoporosis, anemia, CAD, systolic-diastolic HF (EF 30-35%, 2022), who presents to [facility] clinic for follow up visit.  Today, she continues to experience exertional shortness of breath and wheezing.  Recent was evaluated by a pulmonologist and diagnosed with COPD with recommendations to start Stiolto inhaler daily.    Past Medical History:  She has a past medical history of Body mass index (BMI) 19.9 or less, adult, Body mass index (BMI) 20.0-20.9, adult (04/15/2022), and Encounter for screening mammogram for malignant neoplasm of breast.    Past Surgical History:  She has a past surgical history that includes Total vaginal hysterectomy (06/16/2013) and Colonoscopy (02/22/2013).     Social History:  She reports that she quit smoking about 4 years ago. Her smoking use included cigarettes. She has been exposed to tobacco smoke. She has never used smokeless tobacco. She reports that she does not currently use alcohol. She reports that she does not use drugs.    Family History:  Family History   Problem Relation Name Age of Onset   • Hypertension Mother     • Sarcoidosis Sister     • Breast cancer Other     • Colon cancer Other     • Diabetes Other       Allergies:  Jardiance [empagliflozin]    Home Medications:  (Not in a hospital admission)    Review Of Systems:  11-point ROS was performed and is negative except as noted below and in the HPI.     Review of Systems   Constitutional:  Negative for chills and fever.   Eyes:  Negative for discharge.   Respiratory:  Positive for shortness of breath (exertional, stable) and wheezing (exertional, stable). Negative for cough.    Cardiovascular:  Negative for chest pain and leg swelling.   Gastrointestinal:  Negative for abdominal pain, diarrhea, nausea and vomiting.   Genitourinary:  Negative for difficulty urinating,  "dysuria and flank pain.   Musculoskeletal:  Negative for myalgias.   Skin:  Negative for wound.   Neurological:  Negative for dizziness and numbness.   Psychiatric/Behavioral:  Negative for confusion.         Objective   Objective:     /85 (BP Location: Right arm, Patient Position: Sitting, BP Cuff Size: Adult)   Pulse 83   Ht 1.6 m (5' 3\")   Wt 57.6 kg (127 lb)   BMI 22.50 kg/m²     Physical Exam  Constitutional:       General: She is not in acute distress.     Appearance: Normal appearance.   HENT:      Head: Normocephalic and atraumatic.      Mouth/Throat:      Mouth: Mucous membranes are moist.   Eyes:      Conjunctiva/sclera: Conjunctivae normal.      Pupils: Pupils are equal, round, and reactive to light.   Cardiovascular:      Rate and Rhythm: Normal rate and regular rhythm.      Heart sounds: Normal heart sounds.   Pulmonary:      Effort: No respiratory distress.      Breath sounds: Normal breath sounds. No wheezing or rhonchi.   Abdominal:      General: Bowel sounds are normal.      Palpations: Abdomen is soft.      Tenderness: There is no abdominal tenderness.   Musculoskeletal:         General: No swelling.      Cervical back: Neck supple.   Skin:     General: Skin is warm and dry.   Neurological:      General: No focal deficit present.      Mental Status: She is alert.          Assessment & Plan:     Assessment/Plan     Problem List Items Addressed This Visit       Anemia     Stable, hemoglobin at baseline, no signs of bleeding         Relevant Orders    Referral to Home Health    CAD (coronary artery disease)    Relevant Orders    Referral to Home Health    Chronic combined systolic and diastolic heart failure, NYHA class 2 (Multi) - Primary    Relevant Orders    Comprehensive Metabolic Panel    Lipid Panel    TSH with reflex to Free T4 if abnormal    Microscopic Only, Urine    Albumin-Creatinine Ratio, Urine Random    CBC    Essential hypertension     Well-controlled with metoprolol 25 mg " daily, Entresto, Aldactone 25 mg, and torsemide 20 mg.         Hyperlipidemia     Well-controlled with pravastatin 40 mg         Relevant Orders    Referral to Home Health    Hypothyroidism     Well-controlled with TSH within normal limits  Continue levothyroxine 88 mcg         Relevant Orders    Referral to Home Health    Ischemic dilated cardiomyopathy (Multi)    Vitamin D deficiency     Continue taking vitamin D 2000 daily         Relevant Orders    Vitamin D 25-Hydroxy,Total (for eval of Vitamin D levels)    Referral to Home Health    Type 2 diabetes mellitus (Multi)     Improving with metformin 500 mg daily  A1c improving at 7.2         Relevant Orders    Referral to Home Health    Referral to Home Health    POCT glycosylated hemoglobin (Hb A1C) manually resulted (Completed)    Stage 3 chronic kidney disease, unspecified whether stage 3a or 3b CKD (Multi)     Creatinine at the baseline  Continue Entresto and Torsemide 20 mg         Relevant Orders    Referral to Home Health    Dyspnea on exertion     Likely due to COPD and combination with heart failure.  Continue follow-up with cardiology and pulmonology.         COPD (chronic obstructive pulmonary disease) (Multi)     PFT significant for COPD per pulmonology evaluation  Continue Stiolto as instructed  Follow-up with pulmonology          Other Visit Diagnoses       Cyst of left breast        Relevant Orders    BI mammo bilateral screening tomosynthesis    Encounter for screening mammogram for malignant neoplasm of breast        Relevant Orders    BI mammo bilateral screening tomosynthesis    Referral to Home Health    Chronic heart failure with preserved ejection fraction (Multi)        Relevant Orders    Referral to Home Health    Heart failure with preserved left ventricular function (HFpEF) (Multi)        Relevant Orders    Referral to Home Health    Primary hypertension        Relevant Orders    Referral to Home Health    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 ([facility])        Relevant Orders    Referral to Home Health    Gastroesophageal reflux disease without esophagitis        Relevant Orders    Referral to Home Health    Hemorrhoids, unspecified hemorrhoid type        Relevant Orders    Referral to Home Health    Nausea and vomiting, unspecified vomiting type        Relevant Orders    Referral to Home Health    Asthma, unspecified asthma severity, unspecified whether complicated, unspecified whether persistent ([facility])        Relevant Orders    Referral to Home Health    Cigarette nicotine dependence without complication        Relevant Orders    Referral to Home Health    Gait disorder        Relevant Orders    Referral to Home Health    Primary insomnia        Relevant Orders    Referral to Home Health    Cerebrovascular accident (CVA) due to embolism of left middle cerebral artery (Multi)        Relevant Orders    Referral to Home Health    Anxiety        Relevant Orders    Referral to Home Health    Hypocalcemia        Relevant Orders    Referral to Home Health    Anemia of renal disease        Relevant Orders    Referral to Home Health    Fibromyalgia        Relevant Orders    Referral to Home Health    Intractable headache, unspecified chronicity pattern, unspecified headache type        Relevant Orders    Referral to Home Health    History of thrombotic thrombocytopenic purpura        Relevant Orders    Referral to Home Health    Shortness of breath on exertion        Relevant Orders    Referral to Home Health    Opioid type dependence, continuous (Multi)        Relevant Orders    Referral to Home Health    Acid indigestion        Relevant Orders    Referral to Home Health    Encounter for hepatitis C screening test for low risk patient        Relevant Orders    Referral to Home Health    Encounter for screening for HIV        Relevant Orders    Referral to Home Health            #Health Maintenance:  - Routine  labs in January  - Mammogram ordered  - In office A1c done today  - Follows up with Cardiology and Pulmonology for SOB and wheezing  - Follows up with Heme/Onc for anemia  - Home health aide referral ordered    Revisit Topics: home health aide approval, pulm rehab for SOB and wheezing (COPD on Stiolto)    Dispo: Patient is scheduled to return to clinic in January.    Armen Escobar, PGY-3  Internal Medicine     Disclaimer: Documentation completed with the information available at the time of input. The times in the chart may not be reflective of actual patient care times, interventions, or procedures. Documentation occurs after the physical care of the patient.

## 2024-09-27 ENCOUNTER — APPOINTMENT (OUTPATIENT)
Dept: PULMONOLOGY | Facility: CLINIC | Age: 79
End: 2024-09-27
Payer: MEDICARE

## 2024-10-11 ENCOUNTER — OFFICE VISIT (OUTPATIENT)
Dept: PULMONOLOGY | Facility: CLINIC | Age: 79
End: 2024-10-11
Payer: MEDICARE

## 2024-10-11 VITALS
OXYGEN SATURATION: 98 % | TEMPERATURE: 97.5 F | HEART RATE: 81 BPM | DIASTOLIC BLOOD PRESSURE: 90 MMHG | SYSTOLIC BLOOD PRESSURE: 138 MMHG

## 2024-10-11 DIAGNOSIS — J43.2 CENTRILOBULAR EMPHYSEMA (MULTI): Primary | ICD-10-CM

## 2024-10-11 DIAGNOSIS — I27.23: ICD-10-CM

## 2024-10-11 PROCEDURE — 3075F SYST BP GE 130 - 139MM HG: CPT | Performed by: INTERNAL MEDICINE

## 2024-10-11 PROCEDURE — 1036F TOBACCO NON-USER: CPT | Performed by: INTERNAL MEDICINE

## 2024-10-11 PROCEDURE — 3079F DIAST BP 80-89 MM HG: CPT | Performed by: INTERNAL MEDICINE

## 2024-10-11 PROCEDURE — 1160F RVW MEDS BY RX/DR IN RCRD: CPT | Performed by: INTERNAL MEDICINE

## 2024-10-11 PROCEDURE — 1159F MED LIST DOCD IN RCRD: CPT | Performed by: INTERNAL MEDICINE

## 2024-10-11 PROCEDURE — 1126F AMNT PAIN NOTED NONE PRSNT: CPT | Performed by: INTERNAL MEDICINE

## 2024-10-11 PROCEDURE — 99214 OFFICE O/P EST MOD 30 MIN: CPT | Performed by: INTERNAL MEDICINE

## 2024-10-11 ASSESSMENT — ENCOUNTER SYMPTOMS
SHORTNESS OF BREATH: 0
COUGH: 0

## 2024-10-11 ASSESSMENT — PAIN SCALES - GENERAL: PAINLEVEL: 0-NO PAIN

## 2024-10-11 NOTE — PROGRESS NOTES
Department of Medicine  Division of Pulmonary, Critical Care, and Sleep Medicine  Follow Up  [facility], [address]    Dana Corrigan is a 79 y.o. female who returns as a follow up for COPD. Last visit was on 3/26/2024.    Physician HPI (10/11/2024):  Since last visit, we referred her to pulmonary rehab and encouraged her to take her Stiolto on a daily basis. She states that she notices a difference in her breathing on the days she forgets to take her Stiolto.  She is also taking a break from pulmonary rehab, but states when she was attending it she did thoroughly enjoy the classes and would like to resume them when she is more able to.  She uses her albuterol 1-2 times per week.    Per previous notes:  3/26/2024:  79 y.o. year-old female here to review PFT results. She states that her breathing is somewhat improved since starting Stiolto.  She does admit to only using this on occasion, however.  She denies any wheeze, cough.  She is not very physically active. She is a former 60 pack/year smoker who quit in 2020. States she is being evaluated for iron infusions due to anemia.     2/23/2024:  79 y.o. year-old female with severe CAD, HFrEF (recovered ejection fraction) who has developed shortness of breath in 2021 when she was diagnosed with new onset heart failure.  She has been doing well up until approximately 1 month ago when her shortness of breath got worse.  Patient is here with her daughter who believes that the patient may have developed a URI, but this never manifested as an acute illness.  She did complain of wheezing, and occasional productive cough of clearish sputum.  Patient's daughter states that a month ago she was able to ambulate about without much difficulty, however, she is now requiring a wheelchair to even move around from the parking lot to our office.     She denies any chest pain, weight gain, peripheral edema.  She has never been on any breathing medications.  She is a former  60-pack-year smoker who quit in 2020.  We have no PFTs on file for her.  Last chest imaging was from 2021 which was notable for emphysema and a right pleural effusion.  As part of her diagnostic workup in 2021 she had a right heart cath: RV: 37/8, mPAP: 21 PWCP: 7, CO 4.0,  (3.5WU).    I have personally reviewed PMH, PSH, Family History in the HISTORY tab of this chart, and unless noted above are not pertinent to the presenting complaint.  I have personally reviewed Social History as provided in the electronic medical record.    Immunization History:  Immunization History   Administered Date(s) Administered    Td vaccine, age 7 years and older (TDVAX) 02/11/2004       Current Medications:  Current Outpatient Medications   Medication Instructions    albuterol (Ventolin HFA) 90 mcg/actuation inhaler 2 puffs, inhalation, Every 4 hours PRN    alendronate (FOSAMAX) 70 mg, oral, Every 7 days, Take in the morning with a full glass of water, on an empty stomach, and do not take anything else by mouth or lie down for the next 30 min.    aspirin 81 mg, oral, Daily    blood-glucose meter (OneTouch Ultra2 Meter) misc 1 Units, miscellaneous, 3 times daily    cholecalciferol (VITAMIN D-3) 2,000 Units, oral, Daily    lancets (OneTouch UltraSoft 2 Lancet) 30 gauge misc 1 Lancet, miscellaneous, 3 times daily    levothyroxine (SYNTHROID, LEVOXYL) 88 mcg, oral, Every other day    metFORMIN XR (GLUCOPHAGE-XR) 1,000 mg, oral, Daily with evening meal    metoprolol succinate XL (TOPROL-XL) 25 mg, oral, Daily, Do not crush or chew.    OneTouch Ultra Test strip 1 strip, miscellaneous, 3 times daily    pravastatin (PRAVACHOL) 40 mg, oral, Nightly    sacubitriL-valsartan (Entresto)  mg tablet 1 tablet, oral, 2 times daily    spironolactone (ALDACTONE) 12.5 mg, oral, 2 times daily    tiotropium-olodateroL (Stiolto Respimat) 2.5-2.5 mcg/actuation mist inhaler 2 Inhalations, inhalation, Daily    torsemide (Demadex) 20 mg tablet Take  as needed for swelling        Drug Allergies/Intolerances:  Allergies   Allergen Reactions    Jardiance [Empagliflozin] Itching        Review of Systems:  Review of Systems   Respiratory:  Negative for cough and shortness of breath.    Cardiovascular:  Negative for chest pain and leg swelling.        All other review of systems are negative and/or non-contributory.    Physical Examination:  Vitals:    10/11/24 1601 10/11/24 1617   BP: 168/84 138/90   BP Location: Right arm Right arm   Pulse: 81    Temp: 36.4 °C (97.5 °F)    SpO2: 98%           GEN: appears well. Here with her daughter  CV: +murmur. RRR  LUNGS: good effort, clear bilaterally, no w/r/r  EXT: no edema, cyanosis, clubbing      Exacerbation History     None    Pulmonary Function Test Results     2024:  FVC: 1.87 L (88%)  FEV1: 0.78 L (48%)  FEV1/FVC: 42  T.61 L (117%)  RV: 120%  DLCO: 37%  Minimal bronchodilator response.  Air trapping.    O2 Requirements     6MWT: 207m 49% predicted. SpO2 jose 89% on room air     Sleep Study     None    CAT score     Nil    Peak Flow and ACT     N/A    Chest Radiograph     XR CHEST 1 VIEW 10/28/2021     Impression  Mild cardiomegaly with a small to moderate right pleural effusion.    Chest CT Scan     10/28/2021  1.  Significantly limited study due to motion artifact. Within the  limitations no central pulmonary embolism is identified. Unable to  assess for small segmental or subsegmental PE.  2.  Moderate right pleural effusion and bibasilar atelectasis.  3. Significant reflux of contrast into the dilated IVC and hepatic  veins. Correlation with right heart failure/strain/dysfunction. Mild  diffuse soft tissue edema/anasarca.  4. Moderate to severe upper lung predominant emphysema. Moderate  coronary artery calcification.  5. Dilated main pulmonary artery. Correlation with pulmonary artery  hypertension.  6. Other chronic findings as described above.    Right heart catheterization     :  RA:  (5)  RV:  37/8  PA: 37/14 (21)  PCWP: 7  CO: 4.0  CI: 2.6  SVR: 1440 dynes  PVR: 280 dynes (3.5 PELAYO)    Labs     Lab Results   Component Value Date    WBC 5.5 06/03/2024    HGB 10.0 (L) 06/03/2024    HCT 33.5 (L) 06/03/2024    MCV 63 (L) 06/03/2024     06/03/2024     Lab Results   Component Value Date    BNP 86 06/03/2024     Lab Results   Component Value Date    EOSABS 0.02 10/31/2021       Echocardiogram     No results found for this or any previous visit from the past 365 days.       ASSESSMENT & PLAN     Problem List Items Addressed This Visit    None       SUMMARY:  79 y.o. female  with severe GOLD group B COPD (FEV1 48%). Patient is doing overall well. No exacerbations since last visit. She also has group II and III pulmonary HTN.    PLAN:  -Bronchodilators: continue on Stiolto and PRN albuterol  -Vaccinations: influenza: agreeable to receive this season;   -Lung cancer screening: no longer required due to age  -Smoking cessation: already quit  -Pulmonary rehab: encouraged to rejoin when able  -Oxygen: not required    Follow-up: 1 year or PRN      Von Jeffries DO  Staff Physician - Pulmonary & Critical Care  10/11/24 4:03 PM  Office number: [phone]   Fax number:  [phone]

## 2024-10-22 ENCOUNTER — HOSPITAL ENCOUNTER (OUTPATIENT)
Dept: RADIOLOGY | Facility: CLINIC | Age: 79
Discharge: HOME | End: 2024-10-22
Payer: MEDICARE

## 2024-10-22 DIAGNOSIS — Z12.31 ENCOUNTER FOR SCREENING MAMMOGRAM FOR MALIGNANT NEOPLASM OF BREAST: ICD-10-CM

## 2024-10-22 DIAGNOSIS — N60.02 CYST OF LEFT BREAST: ICD-10-CM

## 2024-10-22 PROCEDURE — 77062 BREAST TOMOSYNTHESIS BI: CPT

## 2024-10-22 PROCEDURE — G0279 TOMOSYNTHESIS, MAMMO: HCPCS | Performed by: STUDENT IN AN ORGANIZED HEALTH CARE EDUCATION/TRAINING PROGRAM

## 2024-10-22 PROCEDURE — 77066 DX MAMMO INCL CAD BI: CPT | Performed by: STUDENT IN AN ORGANIZED HEALTH CARE EDUCATION/TRAINING PROGRAM

## 2025-01-13 ENCOUNTER — DOCUMENTATION (OUTPATIENT)
Dept: HOME HEALTH SERVICES | Facility: HOME HEALTH | Age: 80
End: 2025-01-13

## 2025-01-13 ENCOUNTER — LAB (OUTPATIENT)
Dept: LAB | Facility: LAB | Age: 80
End: 2025-01-13
Payer: MEDICARE

## 2025-01-13 ENCOUNTER — APPOINTMENT (OUTPATIENT)
Dept: PRIMARY CARE | Facility: CLINIC | Age: 80
End: 2025-01-13
Payer: MEDICARE

## 2025-01-13 ENCOUNTER — TELEPHONE (OUTPATIENT)
Dept: HOME HEALTH SERVICES | Facility: HOME HEALTH | Age: 80
End: 2025-01-13

## 2025-01-13 VITALS
WEIGHT: 130 LBS | SYSTOLIC BLOOD PRESSURE: 180 MMHG | DIASTOLIC BLOOD PRESSURE: 74 MMHG | HEART RATE: 88 BPM | HEIGHT: 63 IN | BODY MASS INDEX: 23.04 KG/M2

## 2025-01-13 DIAGNOSIS — I50.32 CHRONIC HEART FAILURE WITH PRESERVED EJECTION FRACTION: ICD-10-CM

## 2025-01-13 DIAGNOSIS — E11.69 TYPE 2 DIABETES MELLITUS WITH OTHER SPECIFIED COMPLICATION, UNSPECIFIED WHETHER LONG TERM INSULIN USE (MULTI): ICD-10-CM

## 2025-01-13 DIAGNOSIS — M79.7 FIBROMYALGIA: ICD-10-CM

## 2025-01-13 DIAGNOSIS — N18.30 STAGE 3 CHRONIC KIDNEY DISEASE, UNSPECIFIED WHETHER STAGE 3A OR 3B CKD (MULTI): ICD-10-CM

## 2025-01-13 DIAGNOSIS — K21.9 GASTROESOPHAGEAL REFLUX DISEASE WITHOUT ESOPHAGITIS: ICD-10-CM

## 2025-01-13 DIAGNOSIS — E89.0 POSTOPERATIVE HYPOTHYROIDISM: ICD-10-CM

## 2025-01-13 DIAGNOSIS — I50.42 CHRONIC COMBINED SYSTOLIC AND DIASTOLIC HEART FAILURE, NYHA CLASS 2: ICD-10-CM

## 2025-01-13 DIAGNOSIS — E78.5 HYPERLIPIDEMIA, UNSPECIFIED HYPERLIPIDEMIA TYPE: Primary | ICD-10-CM

## 2025-01-13 DIAGNOSIS — E11.9 TYPE 2 DIABETES MELLITUS WITHOUT COMPLICATION, UNSPECIFIED WHETHER LONG TERM INSULIN USE (MULTI): ICD-10-CM

## 2025-01-13 DIAGNOSIS — F11.20 OPIOID TYPE DEPENDENCE, CONTINUOUS (MULTI): ICD-10-CM

## 2025-01-13 DIAGNOSIS — F51.01 PRIMARY INSOMNIA: ICD-10-CM

## 2025-01-13 DIAGNOSIS — I25.738: ICD-10-CM

## 2025-01-13 DIAGNOSIS — Z12.31 ENCOUNTER FOR SCREENING MAMMOGRAM FOR MALIGNANT NEOPLASM OF BREAST: ICD-10-CM

## 2025-01-13 DIAGNOSIS — Z11.59 ENCOUNTER FOR HEPATITIS C SCREENING TEST FOR LOW RISK PATIENT: ICD-10-CM

## 2025-01-13 DIAGNOSIS — N18.9 ANEMIA OF RENAL DISEASE: ICD-10-CM

## 2025-01-13 DIAGNOSIS — I25.810 CORONARY ARTERY DISEASE INVOLVING CORONARY BYPASS GRAFT, UNSPECIFIED WHETHER ANGINA PRESENT, UNSPECIFIED WHETHER NATIVE OR TRANSPLANTED HEART: ICD-10-CM

## 2025-01-13 DIAGNOSIS — E83.51 HYPOCALCEMIA: ICD-10-CM

## 2025-01-13 DIAGNOSIS — R06.02 SHORTNESS OF BREATH ON EXERTION: ICD-10-CM

## 2025-01-13 DIAGNOSIS — M81.0 OSTEOPOROSIS, UNSPECIFIED OSTEOPOROSIS TYPE, UNSPECIFIED PATHOLOGICAL FRACTURE PRESENCE: ICD-10-CM

## 2025-01-13 DIAGNOSIS — R26.9 GAIT DISORDER: ICD-10-CM

## 2025-01-13 DIAGNOSIS — J44.9 CHRONIC OBSTRUCTIVE PULMONARY DISEASE, UNSPECIFIED COPD TYPE (MULTI): ICD-10-CM

## 2025-01-13 DIAGNOSIS — Z86.2 HISTORY OF THROMBOTIC THROMBOCYTOPENIC PURPURA: ICD-10-CM

## 2025-01-13 DIAGNOSIS — K64.9 HEMORRHOIDS, UNSPECIFIED HEMORRHOID TYPE: ICD-10-CM

## 2025-01-13 DIAGNOSIS — K30 ACID INDIGESTION: ICD-10-CM

## 2025-01-13 DIAGNOSIS — E55.9 VITAMIN D DEFICIENCY: ICD-10-CM

## 2025-01-13 DIAGNOSIS — R51.9 INTRACTABLE HEADACHE, UNSPECIFIED CHRONICITY PATTERN, UNSPECIFIED HEADACHE TYPE: ICD-10-CM

## 2025-01-13 DIAGNOSIS — I10 ESSENTIAL HYPERTENSION: ICD-10-CM

## 2025-01-13 DIAGNOSIS — F17.210 CIGARETTE NICOTINE DEPENDENCE WITHOUT COMPLICATION: ICD-10-CM

## 2025-01-13 DIAGNOSIS — R11.2 NAUSEA AND VOMITING, UNSPECIFIED VOMITING TYPE: ICD-10-CM

## 2025-01-13 DIAGNOSIS — I63.412 CEREBROVASCULAR ACCIDENT (CVA) DUE TO EMBOLISM OF LEFT MIDDLE CEREBRAL ARTERY (MULTI): ICD-10-CM

## 2025-01-13 DIAGNOSIS — D63.1 ANEMIA OF RENAL DISEASE: ICD-10-CM

## 2025-01-13 DIAGNOSIS — J45.909 ASTHMA, UNSPECIFIED ASTHMA SEVERITY, UNSPECIFIED WHETHER COMPLICATED, UNSPECIFIED WHETHER PERSISTENT (HHS-HCC): ICD-10-CM

## 2025-01-13 DIAGNOSIS — Z11.4 ENCOUNTER FOR SCREENING FOR HIV: ICD-10-CM

## 2025-01-13 DIAGNOSIS — F41.9 ANXIETY: ICD-10-CM

## 2025-01-13 DIAGNOSIS — D64.9 ANEMIA, UNSPECIFIED TYPE: ICD-10-CM

## 2025-01-13 DIAGNOSIS — I50.30 HEART FAILURE WITH PRESERVED LEFT VENTRICULAR FUNCTION (HFPEF): ICD-10-CM

## 2025-01-13 DIAGNOSIS — I10 PRIMARY HYPERTENSION: ICD-10-CM

## 2025-01-13 DIAGNOSIS — E78.5 HYPERLIPIDEMIA, UNSPECIFIED HYPERLIPIDEMIA TYPE: ICD-10-CM

## 2025-01-13 DIAGNOSIS — D50.8 IRON DEFICIENCY ANEMIA SECONDARY TO INADEQUATE DIETARY IRON INTAKE: ICD-10-CM

## 2025-01-13 LAB
25(OH)D3 SERPL-MCNC: 32 NG/ML (ref 30–100)
ALBUMIN SERPL BCP-MCNC: 4.2 G/DL (ref 3.4–5)
ALP SERPL-CCNC: 62 U/L (ref 33–136)
ALT SERPL W P-5'-P-CCNC: 6 U/L (ref 7–45)
ANION GAP SERPL CALC-SCNC: 13 MMOL/L (ref 10–20)
AST SERPL W P-5'-P-CCNC: 12 U/L (ref 9–39)
BILIRUB SERPL-MCNC: 0.7 MG/DL (ref 0–1.2)
BUN SERPL-MCNC: 21 MG/DL (ref 6–23)
CALCIUM SERPL-MCNC: 9.9 MG/DL (ref 8.6–10.6)
CHLORIDE SERPL-SCNC: 98 MMOL/L (ref 98–107)
CHOLEST SERPL-MCNC: 222 MG/DL (ref 0–199)
CHOLESTEROL/HDL RATIO: 2.9
CO2 SERPL-SCNC: 31 MMOL/L (ref 21–32)
CREAT SERPL-MCNC: 1.2 MG/DL (ref 0.5–1.05)
EGFRCR SERPLBLD CKD-EPI 2021: 46 ML/MIN/1.73M*2
ERYTHROCYTE [DISTWIDTH] IN BLOOD BY AUTOMATED COUNT: 16.2 % (ref 11.5–14.5)
GLUCOSE SERPL-MCNC: 383 MG/DL (ref 74–99)
HCT VFR BLD AUTO: 35 % (ref 36–46)
HDLC SERPL-MCNC: 76.5 MG/DL
HGB BLD-MCNC: 10.2 G/DL (ref 12–16)
LDLC SERPL DIRECT ASSAY-MCNC: 90 MG/DL (ref 0–129)
MCH RBC QN AUTO: 18.9 PG (ref 26–34)
MCHC RBC AUTO-ENTMCNC: 29.1 G/DL (ref 32–36)
MCV RBC AUTO: 65 FL (ref 80–100)
NON-HDL CHOLESTEROL: 146 MG/DL (ref 0–149)
NRBC BLD-RTO: 0 /100 WBCS (ref 0–0)
PLATELET # BLD AUTO: 295 X10*3/UL (ref 150–450)
POTASSIUM SERPL-SCNC: 4.2 MMOL/L (ref 3.5–5.3)
PROT SERPL-MCNC: 7.2 G/DL (ref 6.4–8.2)
RBC # BLD AUTO: 5.39 X10*6/UL (ref 4–5.2)
SODIUM SERPL-SCNC: 138 MMOL/L (ref 136–145)
T4 FREE SERPL-MCNC: 0.57 NG/DL (ref 0.78–1.48)
TSH SERPL-ACNC: 63.57 MIU/L (ref 0.44–3.98)
WBC # BLD AUTO: 4.8 X10*3/UL (ref 4.4–11.3)

## 2025-01-13 PROCEDURE — 84443 ASSAY THYROID STIM HORMONE: CPT

## 2025-01-13 PROCEDURE — 1159F MED LIST DOCD IN RCRD: CPT | Performed by: INTERNAL MEDICINE

## 2025-01-13 PROCEDURE — 82465 ASSAY BLD/SERUM CHOLESTEROL: CPT

## 2025-01-13 PROCEDURE — 84439 ASSAY OF FREE THYROXINE: CPT

## 2025-01-13 PROCEDURE — 82306 VITAMIN D 25 HYDROXY: CPT

## 2025-01-13 PROCEDURE — 85027 COMPLETE CBC AUTOMATED: CPT

## 2025-01-13 PROCEDURE — G0439 PPPS, SUBSEQ VISIT: HCPCS | Performed by: INTERNAL MEDICINE

## 2025-01-13 PROCEDURE — 99214 OFFICE O/P EST MOD 30 MIN: CPT | Performed by: INTERNAL MEDICINE

## 2025-01-13 PROCEDURE — 1170F FXNL STATUS ASSESSED: CPT | Performed by: INTERNAL MEDICINE

## 2025-01-13 PROCEDURE — 3077F SYST BP >= 140 MM HG: CPT | Performed by: INTERNAL MEDICINE

## 2025-01-13 PROCEDURE — 1036F TOBACCO NON-USER: CPT | Performed by: INTERNAL MEDICINE

## 2025-01-13 PROCEDURE — 80053 COMPREHEN METABOLIC PANEL: CPT

## 2025-01-13 PROCEDURE — 83718 ASSAY OF LIPOPROTEIN: CPT

## 2025-01-13 PROCEDURE — 83721 ASSAY OF BLOOD LIPOPROTEIN: CPT

## 2025-01-13 PROCEDURE — 3078F DIAST BP <80 MM HG: CPT | Performed by: INTERNAL MEDICINE

## 2025-01-13 ASSESSMENT — PATIENT HEALTH QUESTIONNAIRE - PHQ9
2. FEELING DOWN, DEPRESSED OR HOPELESS: NOT AT ALL
SUM OF ALL RESPONSES TO PHQ9 QUESTIONS 1 AND 2: 0
1. LITTLE INTEREST OR PLEASURE IN DOING THINGS: NOT AT ALL

## 2025-01-13 ASSESSMENT — ACTIVITIES OF DAILY LIVING (ADL)
DOING_HOUSEWORK: INDEPENDENT
GROCERY_SHOPPING: INDEPENDENT
GROCERY_SHOPPING: NEEDS ASSISTANCE
TAKING_MEDICATION: INDEPENDENT
BATHING: INDEPENDENT
DRESSING: INDEPENDENT
TAKING_MEDICATION: INDEPENDENT
DOING_HOUSEWORK: INDEPENDENT
MANAGING_FINANCES: INDEPENDENT
MANAGING_FINANCES: NEEDS ASSISTANCE

## 2025-01-13 ASSESSMENT — LIFESTYLE VARIABLES
HOW OFTEN DO YOU HAVE A DRINK CONTAINING ALCOHOL: NEVER
HOW OFTEN DO YOU HAVE SIX OR MORE DRINKS ON ONE OCCASION: NEVER
SKIP TO QUESTIONS 9-10: 1
HOW MANY STANDARD DRINKS CONTAINING ALCOHOL DO YOU HAVE ON A TYPICAL DAY: PATIENT DOES NOT DRINK
AUDIT-C TOTAL SCORE: 0

## 2025-01-13 NOTE — PROGRESS NOTES
Subjective   Subjective:     History Of Present Illness:  Lisset Corrigan is a 79 y.o. female with a PMH of HTN, HLD, DM2, hypothyroidism, osteoporosis, anemia, CAD, systolic-diastolic HF (EF 30-35%, 2022), who presents to [facility] clinic for wellness exam.  She admits to experiencing mechanical fall a month ago, where she fell on her buttock at home after losing balance from turning too quickly while she was walking.  Placed an additional referral for home health aide.  Encouraged patient to drink more fluids, at least 50 fluid ounces daily.      Medicare Wellness Billing Compliance Satisfied    *This is a visual tool to show completion of required items on the day of the visit. Green checks will only appear on the date of visit.    Review all medications by prescribing practitioner or clinical pharmacist (such as prescriptions, OTCs, herbal therapies and supplements) documented in the medical record    Past Medical, Surgical, and Family History reviewed and updated in chart    Tobacco Use Reviewed    Alcohol Use Reviewed    Illicit Drug Use Reviewed    PHQ2/9    Falls in Last Year Reviewed    Home Safety Risk Factors Reviewed    Cognitive Impairment Reviewed    Patient Self Assessment and Health Status    Current Diet Reviewed    Exercise Frequency    ADL - Hearing Impairment    ADL - Bathing    ADL - Dressing    ADL - Walks in Home    IADL - Managing Finances    IADL - Grocery Shopping    IADL - Taking Medications    IADL - Doing Housework        Past Medical History:  She has a past medical history of Body mass index (BMI) 19.9 or less, adult, Body mass index (BMI) 20.0-20.9, adult (04/15/2022), and Encounter for screening mammogram for malignant neoplasm of breast.    Past Surgical History:  She has a past surgical history that includes Total vaginal hysterectomy (06/16/2013) and Colonoscopy (02/22/2013).     Social History:  She reports that she quit smoking about 5 years ago. Her  "smoking use included cigarettes. She has been exposed to tobacco smoke. She has never used smokeless tobacco. She reports that she does not currently use alcohol. She reports that she does not use drugs.    Family History:  Family History   Problem Relation Name Age of Onset    Hypertension Mother      Sarcoidosis Sister      Breast cancer Other      Colon cancer Other      Diabetes Other       Allergies:  Jardiance [empagliflozin]    Home Medications:  (Not in a hospital admission)    Review Of Systems:  11-point ROS was performed and is negative except as noted below and in the HPI.     Review of Systems     Objective   Objective:     /74 (BP Location: Right arm, Patient Position: Sitting, BP Cuff Size: Adult)   Pulse 88   Ht 1.6 m (5' 3\")   Wt 59 kg (130 lb)   BMI 23.03 kg/m²     Physical Exam     Assessment & Plan:     Assessment/Plan     Problem List Items Addressed This Visit       Anemia    Relevant Orders    Referral to Home Health    CAD (coronary artery disease)    Relevant Orders    Referral to Home Health    Essential hypertension    Hyperlipidemia - Primary    Relevant Orders    Lipid Panel Non-Fasting    Cholesterol, LDL Direct    Referral to Home Health    Hypothyroidism    Relevant Orders    Referral to Home Health    Osteoporosis    Vitamin D deficiency    Relevant Orders    Referral to Home Health    Type 2 diabetes mellitus    Relevant Orders    Referral to Home Health    Referral to Home Health    Stage 3 chronic kidney disease, unspecified whether stage 3a or 3b CKD (Multi)    Relevant Orders    Referral to Home Health    Iron deficiency anemia    COPD (chronic obstructive pulmonary disease) (Multi)     Other Visit Diagnoses       Chronic heart failure with preserved ejection fraction        Relevant Orders    Referral to Home Health    Heart failure with preserved left ventricular function (HFpEF)        Relevant Orders    Referral to Home Health    Primary hypertension        " Relevant Orders    Referral to Home Health    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        Relevant Orders    Referral to Home Health    Gastroesophageal reflux disease without esophagitis        Relevant Orders    Referral to Home Health    Hemorrhoids, unspecified hemorrhoid type        Relevant Orders    Referral to Home Health    Nausea and vomiting, unspecified vomiting type        Relevant Orders    Referral to Home Health    Asthma, unspecified asthma severity, unspecified whether complicated, unspecified whether persistent ([facility])        Relevant Orders    Referral to Home Health    Cigarette nicotine dependence without complication        Relevant Orders    Referral to Home Health    Gait disorder        Relevant Orders    Referral to Home Health    Primary insomnia        Relevant Orders    Referral to Home Health    Cerebrovascular accident (CVA) due to embolism of left middle cerebral artery (Multi)        Relevant Orders    Referral to Home Health    Anxiety        Relevant Orders    Referral to Home Health    Hypocalcemia        Relevant Orders    Referral to Home Health    Anemia of renal disease        Relevant Orders    Referral to Home Health    Fibromyalgia        Relevant Orders    Referral to Home Health    Intractable headache, unspecified chronicity pattern, unspecified headache type        Relevant Orders    Referral to Home Health    History of thrombotic thrombocytopenic purpura        Relevant Orders    Referral to Home Health    Shortness of breath on exertion        Relevant Orders    Referral to Home Health    Opioid type dependence, continuous (Multi)        Relevant Orders    Referral to Home Health    Encounter for screening mammogram for malignant neoplasm of breast        Relevant Orders    Referral to Home Health    Acid indigestion        Relevant Orders    Referral to Home Health    Encounter for hepatitis C screening test for low  risk patient        Relevant Orders    Referral to Home Health    Encounter for screening for HIV        Relevant Orders    Referral to Home Health          #Health Maintenance:  - Routine labs today    Revisit Topics: home health aide request    Dispo: Patient is scheduled to return to clinic in May.    Armen Escobar, PGY-3  Internal Medicine     Disclaimer: Documentation completed with the information available at the time of input. The times in the chart may not be reflective of actual patient care times, interventions, or procedures. Documentation occurs after the physical care of the patient.

## 2025-01-13 NOTE — TELEPHONE ENCOUNTER
This referral has been made a Non Admit with  Home Care due to No Skilled Disciplines Ordered. If you have further questions, feel free to reach out to our office at [phone]. Thank you, [facility] Intake.    If this patient requires non skilled home care services a referral to healthy at home or  can help to connect her with that level of home care services and other community support.

## 2025-01-17 DIAGNOSIS — E11.9 TYPE 2 DIABETES MELLITUS WITHOUT COMPLICATION, WITHOUT LONG-TERM CURRENT USE OF INSULIN (MULTI): ICD-10-CM

## 2025-01-17 DIAGNOSIS — E06.3 HYPOTHYROIDISM DUE TO HASHIMOTO'S THYROIDITIS: ICD-10-CM

## 2025-01-17 DIAGNOSIS — I50.20 HFREF (HEART FAILURE WITH REDUCED EJECTION FRACTION): ICD-10-CM

## 2025-01-17 RX ORDER — SACUBITRIL AND VALSARTAN 97; 103 MG/1; MG/1
1 TABLET, FILM COATED ORAL 2 TIMES DAILY
Qty: 180 TABLET | Refills: 3 | Status: SHIPPED | OUTPATIENT
Start: 2025-01-17 | End: 2026-01-12

## 2025-01-17 RX ORDER — METFORMIN HYDROCHLORIDE 500 MG/1
1000 TABLET, EXTENDED RELEASE ORAL
Qty: 90 TABLET | Refills: 1 | Status: SHIPPED | OUTPATIENT
Start: 2025-01-17

## 2025-01-17 RX ORDER — LEVOTHYROXINE SODIUM 88 UG/1
88 TABLET ORAL EVERY OTHER DAY
Qty: 45 TABLET | Refills: 0 | Status: SHIPPED | OUTPATIENT
Start: 2025-01-17 | End: 2025-04-17

## 2025-05-05 ENCOUNTER — OFFICE VISIT (OUTPATIENT)
Facility: CLINIC | Age: 80
End: 2025-05-05
Payer: MEDICARE

## 2025-05-05 VITALS
SYSTOLIC BLOOD PRESSURE: 142 MMHG | WEIGHT: 130 LBS | DIASTOLIC BLOOD PRESSURE: 76 MMHG | OXYGEN SATURATION: 92 % | BODY MASS INDEX: 23.04 KG/M2 | RESPIRATION RATE: 20 BRPM | HEART RATE: 96 BPM | HEIGHT: 63 IN

## 2025-05-05 DIAGNOSIS — I25.10 CORONARY ARTERY DISEASE, UNSPECIFIED VESSEL OR LESION TYPE, UNSPECIFIED WHETHER ANGINA PRESENT, UNSPECIFIED WHETHER NATIVE OR TRANSPLANTED HEART: ICD-10-CM

## 2025-05-05 DIAGNOSIS — I50.20 HFREF (HEART FAILURE WITH REDUCED EJECTION FRACTION): ICD-10-CM

## 2025-05-05 DIAGNOSIS — I50.32 HEART FAILURE WITH IMPROVED EJECTION FRACTION (HFIMPEF): ICD-10-CM

## 2025-05-05 DIAGNOSIS — I50.20 HFREF (HEART FAILURE WITH REDUCED EJECTION FRACTION): Primary | ICD-10-CM

## 2025-05-05 PROCEDURE — 99215 OFFICE O/P EST HI 40 MIN: CPT | Performed by: STUDENT IN AN ORGANIZED HEALTH CARE EDUCATION/TRAINING PROGRAM

## 2025-05-05 PROCEDURE — 3077F SYST BP >= 140 MM HG: CPT | Performed by: STUDENT IN AN ORGANIZED HEALTH CARE EDUCATION/TRAINING PROGRAM

## 2025-05-05 PROCEDURE — 3078F DIAST BP <80 MM HG: CPT | Performed by: STUDENT IN AN ORGANIZED HEALTH CARE EDUCATION/TRAINING PROGRAM

## 2025-05-05 PROCEDURE — 1036F TOBACCO NON-USER: CPT | Performed by: STUDENT IN AN ORGANIZED HEALTH CARE EDUCATION/TRAINING PROGRAM

## 2025-05-05 PROCEDURE — 1159F MED LIST DOCD IN RCRD: CPT | Performed by: STUDENT IN AN ORGANIZED HEALTH CARE EDUCATION/TRAINING PROGRAM

## 2025-05-05 RX ORDER — METOPROLOL SUCCINATE 25 MG/1
25 TABLET, EXTENDED RELEASE ORAL DAILY
Qty: 90 TABLET | Refills: 3 | Status: SHIPPED | OUTPATIENT
Start: 2025-05-05

## 2025-05-05 RX ORDER — SPIRONOLACTONE 25 MG/1
25 TABLET ORAL DAILY
Qty: 90 TABLET | Refills: 3 | Status: SHIPPED | OUTPATIENT
Start: 2025-05-05 | End: 2026-05-05

## 2025-05-05 RX ORDER — TORSEMIDE 20 MG/1
20 TABLET ORAL DAILY PRN
Qty: 90 TABLET | Refills: 1 | Status: SHIPPED | OUTPATIENT
Start: 2025-05-05

## 2025-05-05 RX ORDER — TORSEMIDE 20 MG/1
TABLET ORAL
Qty: 90 TABLET | Refills: 1 | Status: SHIPPED | OUTPATIENT
Start: 2025-05-05 | End: 2025-05-05 | Stop reason: SDUPTHER

## 2025-05-05 ASSESSMENT — ENCOUNTER SYMPTOMS: DEPRESSION: 0

## 2025-05-05 NOTE — PROGRESS NOTES
"Former cardiologist: Dr. Jay Carolina  Accompanied by: grand daughter, Nalini      Chief Complaint    Ambulatory heart failure care         History of Present Illness      80 y.o.  retired  with past medical history of HFrEF (LVEF 30 to 35% as of 10/29/2021 TTE) secondary to ischemic cardiomyopathy, severe multivessel CAD that is currently medically managed, non-insulin-dependent diabetes mellitus, hypothyroidism, hypertension, history of iron deficiency status post IVF centimeters, and ex smoker quit 10/2021, who presents for follow-up regarding HFimprovedEF management.  She completed cardiac MRI 6/2023 which demonstrated LVEF 52%, with left ventricular hypertrophy, no evidence of scar, infiltration, or thrombus. Moreover there was inferior apical hypokinesis.  Cardiac MRI with regadenoson protocol 7/2023 showed \".. LVEF = 70%.  Nearly transmural subendocardial delayed enhancement involving the mid to apical inferior wall (RCA territory) with associated perfusion defect on stress imaging that indicates superimposed ischemia. Given the transmural scar, this myocardium is unlikely to be viable for revascularization. There is associated mild to moderate hypokinesis of the inferior wall that correlates to  this area. RVEF = 76%. \"   At last visit [facility]  was increased to 97/103 mg twice daily and has been well tolerated.    Between visits she has completed IV iron infusions, and endorses feeling less dyspneic than her previous baseline.  She continues to have some dyspnea at rest and with exertion which she attributes to her COPD.  She denies PND, orthopnea but describes bilateral chest discomfort that comes on when she is occasionally short of breath.  She denies leg swelling (has been using compression stockings).    On higher doses of sacubitril/valsartan she relates that her blood pressure control has been improved and will range in the 130s systolic.  She also volunteered that she believes she has an element of " whitecoat hypertension and always has higher blood pressure when she visits a physician.    Symptomatically she denies chest pain, orthopnoea, PND,  bendopnoea, syncope, pre syncope, palpitations, or leg  swelling.    Functionally she is quite sedentary according to her granddaughter's report.    She is mainly adherent with her heart failure medications.    She has not needed to visit the emergency  , Or to be hospitalized between heart failure cardiology outpatient visit.    Her last heart failure hospitalization 10/2021    Family history:  Mother- ? HF, ?CVA    Surgical history:  Bladder surgery    Obstetric history:  G2   No cardiac complications of surgery    Social history:  -Former smoker (1/2 PPD x 50 years, quit 10/2021)  - No alcohol use.   - No illicit drug use.     Investigations:        The electronic medical record has been reviewed by me for salient history. All cardiovascular imaging and testing available in the electronic medical record, and [employer] has been reviewed.    Medication Documentation Review Audit       Reviewed by Tammie Pulido RN (Registered Nurse) on 05/05/25 at 1121      Medication Order Taking? Sig Documenting Provider Last Dose Status   albuterol (Ventolin HFA) 90 mcg/actuation inhaler [id number] Yes Inhale 2 puffs every 4 hours if needed for wheezing or shortness of breath. Armen Escobar, DO  Active   alendronate (Fosamax) 70 mg tablet [id number] Yes Take 1 tablet (70 mg) by mouth every 7 days. Take in the morning with a full glass of water, on an empty stomach, and do not take anything else by mouth or lie down for the next 30 min. Armen Escobar DO  Active   aspirin 81 mg EC tablet [id number] Yes Take 1 tablet (81 mg) by mouth once daily. Patience Yarbrough MD  Active   blood-glucose meter (OneTouch Ultra2 Meter) misc [id number] Yes 1 Units 3 times a day. Armen Escobar, DO  Active   cholecalciferol (Vitamin D-3) 50 MCG (2000 UT) tablet [id number] Yes Take 1 tablet (2,000 Units)  "by mouth once daily. Armen Escobar DO  Active   lancets (OneTouch UltraSoft 2 Lancet) 30 gauge misc [id number] Yes 1 Lancet 3 times a day. Armen Escobar DO  Active   levothyroxine (Synthroid, Levoxyl) 100 mcg tablet [id number] Yes Take 1 tablet (100 mcg) by mouth early in the morning.. Take on an empty stomach at the same time each day, either 30 to 60 minutes prior to breakfast Armen Escobar DO  Active   metFORMIN  mg 24 hr tablet [id number] Yes Take 2 tablets (1,000 mg) by mouth once daily in the evening. Take with meals. Patience Yarbrough MD  Active   metoprolol succinate XL (Toprol-XL) 25 mg 24 hr tablet [id number] Yes Take 1 tablet (25 mg) by mouth once daily. Do not crush or chew. Armen Escobar DO  Active   OneTouch Ultra Test strip [id number] Yes 1 strip 3 times a day. Armen Escobar DO  Active   pravastatin (Pravachol) 40 mg tablet [id number] Yes Take 1 tablet (40 mg) by mouth once daily at bedtime. Armen Escobar DO  Active   sacubitriL-valsartan (Entresto)  mg tablet [id number] Yes Take 1 tablet by mouth 2 times a day. Patience Yarbrough MD  Active   spironolactone (Aldactone) 25 mg tablet [id number] Yes Take 0.5 tablets (12.5 mg) by mouth 2 times a day.   Patient taking differently: Take 1 tablet (25 mg) by mouth once daily.    Armen Escobar DO  Active   tiotropium-olodateroL (Stiolto Respimat) 2.5-2.5 mcg/actuation mist inhaler [id number] Yes Inhale 2 Inhalations once daily. Armen Escobar DO  Active   torsemide (Demadex) 20 mg tablet [id number] Yes Take as needed for swelling Armen Escobar DO  Active                      Allergies   Allergen Reactions    Jardiance [Empagliflozin] Itching       ROS   Full 10 pt review of symptoms of negative, unless discussed above.        Visit Vitals  /86 (BP Location: Left arm, Patient Position: Sitting, BP Cuff Size: Adult)   Pulse 96   Resp 20   Ht 1.6 m (5' 3\")   Wt 59 kg (130 lb)   SpO2 92%   BMI 23.03 kg/m²   OB Status " "Postmenopausal   Smoking Status Former   BSA 1.62 m²       Vitals:    25 1117 25 1141   BP: 172/86 142/76   BP Location: Left arm Left arm   Patient Position: Sitting Lying   BP Cuff Size: Adult Adult   Pulse: 96    Resp: 20    SpO2: 92%    Weight: 59 kg (130 lb)    Height: 1.6 m (5' 3\")       Physical Exam    Very pleasant thin elderly -American woman in no apparent CP or painful distress  Very well groomed   Neck: No JVD or HJR  CVS: HS 1,2. gd 2 ESM at URSE, GD 2 PSM at LLSE  Resp: CTA bilaterally. Percussion note hyperresonant  Abdomen: Flat, SNT, BS wnl  Extremities: No pedal oedema  Skin: warm and dry  CNS: AO x 4, hearing impairment       Results/Data  MRI Cardiac w/wo contrast for Morph/Funct and Valve Dz 2023 11:12AM Ruddy Garcia   ORDER REVISED TO A  MRI CARDIAC W/WO CONTRAST FOR MORPH/FUNCT AND VALVE DZ BY RADIOLOGIST; Original Order Number: [id number]     Test Name Result Flag Reference   MRI Cardiac w/wo contrast for Morph/Funct and Valve Dz (Report)     FINAL REPORT  Interpreted by: CHANCE VINCENT CHAPMAN, MD   23 09:06  [facility]                              CMR Report      MRN:          [id number]                  Name:       SOPHIA CHASE                  :         1945                  Scan Date:  2023 09:04:35                    Electronically signed by Poncho Vincent  09:06:23     GENERAL INFORMATION   =====================================================================  =====================================     WEIGHT: 125.66 lbs  (57.00 kgs)  BASELINE HR: 81 BPM  SCAN LOCATION: [facility]  REFERRING PHYSICIAN: RUDDY VILLALTA  ATTENDING PHYSICIAN: RUDDY VILLALTA  ACCESSION NUMBER: [id number]  ICD10 CODES: I50.42, [ , ]I25.10  PATIENT HISTORY: DR RUDDY VILLALTA     SUMMARY   =====================================================================  =====================================     Low normal left " ventricular function with mild concentric   hypertrophy.Focal inferoapical hypokinesia with  inferoapical and inferolateral thinning and corresponding resting   perfusion defect .Finding suggest prior  subendocardial ischemia/infarction.     LEFT VENTRICLE: There is mild LV hypertrophy. There is concentric   LVH. LV cavity size is mildly hypoplastic. LV systolic  function is regionally impaired.There is inferoapical hypokinesia.   There is no LV mass/thrombus.  Quantitative LVEF 52 %.     VIABILITY: No scar or infiltrative disease.     RIGHT VENTRICLE: The right ventricle is normal.     LV/RV SEPTUM: The LV/RV septum is normal.     LA/RA SEPTUM: The interatrial septum is hypermobile.     LEFT ATRIUM: The left atrium is normal.     RIGHT ATRIUM: The right atrium is normal.     PLEURAL EFFUSION: There is no pleural effusion.     AORTIC VALVE: The aortic valve is normal.     MITRAL VALVE: There is mild mitral regurgitation.     AORTIC ROOT: The aortic root is normal.        CORE EXAM   =====================================================================  =====================================     MEASUREMENTS   ---------------------------------------------------------------------  -------------------    VOLUMETRIC ANALYSIS     ----------------------------------------------  .----------------------------------------------------.  .   .     . LV  . Reference . RV . Reference .  +-----+----------+------+-----------+----+-----------+  . EDV . ml    . 72.7 . () .  .      .  .   . ml/m\S\2  .   .      .  .      .  . ESV . ml    . 34.9 . (18-55) .  .      .  .   . ml/m\S\2  .   .      .  .      .  . CO . L/min  . 3.06 .      .  .      .  .   . L/min/m\S\2 .   .      .  .      .  .   . g/m\S\2   .   .      .  .      .  . SV . ml    .  38 . () .  .      .  .   . ml/m\S\2  .   .      .  .      .  . EF . %    .  52 . (60-78) .  .      .  '-----+----------+------+-----------+----+-----------'        17 SEGMENT    ---------------------------------------------------------------------  -------------------  .---------------------------------------------------------------------  --------------------.  . Segments      . Wall Motion . Hyperenhancement . Stress   Perfusion . Interpretation .  +--------------------+-------------+------------------+---------------  ---+----------------+  . Base Anterior   .       .         .           .        .  . Base Anteroseptal .       .         .           .        .  . Base Inferoseptal .       .         .           .        .  . Base Inferior   .       .         .           .        .  . Base Inferolateral .       .         .           .        .  . Base Anterolateral .       .         .           .        .  . Mid Anterior    .       .         .           .        .  . Mid Anteroseptal  .       .         .           .        .  . Mid Inferoseptal  .       .         .           .        .  . Mid Inferior    .       .         .           .        .  . Mid Inferolateral .       .         .           .        .  . Mid Anterolateral .       .         .           .        .  . Apical Anterior  .       .         .           .        .  . Apical Septal   .       .         .           .        .  . Apical Inferior  . Severe Hypo .         .           .        .  . Apical Lateral   .       .         .           .        .  . [place]        .       .         .           .        .  +--------------------+-------------+------------------+---------------  ---+----------------+  . RV Segments    . Wall Motion . Hyperenhancement . Stress   Perfusion . Interpretation .  +--------------------+-------------+------------------+---------------  ---+----------------+  . RV Basal Anterior .       .         .           .        .  . RV Basal Inferior .       .         .           .        .  . RV Mid       .       .         .           .        .  . RV Apical     .       .         .           .         .  '--------------------+-------------+------------------+---------------  ---+----------------'        SCAN INFO   =====================================================================  =====================================     GENERAL   ---------------------------------------------------------------------  -------------------    SCANNER     ----------------------------------------------      : [employer]      MODEL: Ingenia       CONTRAST AGENT     ----------------------------------------------      GD CONCENTRATION: 0.5 M        Report generated by [employer], a product of [employer]  [facility]                              CMR Report      MRN:          [id number]                  Name:       SOPHIA CHASE:         1945                  Scan Date:  2023 09:04:35                    Electronically signed by Poncho Vincent  08:50:09     GENERAL INFORMATION   =====================================================================  =====================================     WEIGHT: 125.66 lbs  (57.00 kgs)  BASELINE HR: 81 BPM  SCAN LOCATION: [facility]  REFERRING PHYSICIAN: RUDDY VILLALTA  ATTENDING PHYSICIAN: RUDDY VILLALTA  ACCESSION NUMBER: [id number]  ICD10 CODES: I50.42, [ , ]I25.10  PATIENT HISTORY: DR RUDDY VILLALTA     SUMMARY   =====================================================================  =====================================     Low normal left ventricular function with mild concentric   hypertrophy.Focal inferoapical hypokinesia .     LEFT VENTRICLE: There is mild LV hypertrophy. There is concentric   LVH. LV cavity size is mildly hypoplastic. LV systolic  function is regionally impaired.There is inferoapical hypokinesia.   There is no LV mass/thrombus.  Quantitative LVEF 52 %.     VIABILITY: No scar or infiltrative disease.     RIGHT VENTRICLE: The right ventricle is normal.     LV/RV SEPTUM:  The LV/RV septum is normal.     LA/RA SEPTUM: The interatrial septum is hypermobile.     LEFT ATRIUM: The left atrium is normal.     RIGHT ATRIUM: The right atrium is normal.     PLEURAL EFFUSION: There is no pleural effusion.     AORTIC VALVE: The aortic valve is normal.     MITRAL VALVE: There is mild mitral regurgitation.     AORTIC ROOT: The aortic root is normal.        CORE EXAM   =====================================================================  =====================================     MEASUREMENTS   ---------------------------------------------------------------------  -------------------    VOLUMETRIC ANALYSIS     ----------------------------------------------  .----------------------------------------------------.  .   .     . LV  . Reference . RV . Reference .  +-----+----------+------+-----------+----+-----------+  . EDV . ml    . 72.7 . () .  .      .  .   . ml/m\S\2  .   .      .  .      .  . ESV . ml    . 34.9 . (18-55) .  .      .  .   . ml/m\S\2  .   .      .  .      .  . CO . L/min  . 3.06 .      .  .      .  .   . L/min/m\S\2 .   .      .  .      .  .   . g/m\S\2   .   .      .  .      .  . SV . ml    .  38 . () .  .      .  .   . ml/m\S\2  .   .      .  .      .  . EF . %    .  52 . (60-78) .  .      .  '-----+----------+------+-----------+----+-----------'        17 SEGMENT   ---------------------------------------------------------------------  -------------------  .---------------------------------------------------------------------  --------------------.  . Segments      . Wall Motion . Hyperenhancement . Stress   Perfusion . Interpretation .  +--------------------+-------------+------------------+---------------  ---+----------------+  . Base Anterior   .       .         .           .        .  . Base Anteroseptal .       .         .           .        .  . Base Inferoseptal .       .         .           .        .  . Base Inferior   .       .         .           .         .  . Base Inferolateral .       .         .           .        .  . Base Anterolateral .       .         .           .        .  . Mid Anterior    .       .         .           .        .  . Mid Anteroseptal  .       .         .           .        .  . Mid Inferoseptal  .       .         .           .        .  . Mid Inferior    .       .         .           .        .  . Mid Inferolateral .       .         .           .        .  . Mid Anterolateral .       .         .           .        .  . Apical Anterior  .       .         .           .        .  . Apical Septal   .       .         .           .        .  . Apical Inferior  . Severe Hypo .         .           .        .  . Apical Lateral   .       .         .           .        .  . [place]        .       .         .           .        .  +--------------------+-------------+------------------+---------------  ---+----------------+  . RV Segments    . Wall Motion . Hyperenhancement . Stress   Perfusion . Interpretation .  +--------------------+-------------+------------------+---------------  ---+----------------+  . RV Basal Anterior .       .         .           .        .  . RV Basal Inferior .       .         .           .        .  . RV Mid       .       .         .           .        .  . RV Apical     .       .         .           .        .  '--------------------+-------------+------------------+---------------  ---+----------------'        SCAN INFO   =====================================================================  =====================================     GENERAL   ---------------------------------------------------------------------  -------------------    SCANNER     ----------------------------------------------      : [employer]      MODEL: Ingenia       CONTRAST AGENT     ----------------------------------------------      GD CONCENTRATION: 0.5 M        Report generated by [employer], a product of [employer]    Electronically signed by: CHANCE CHAVEZ  06/12/23 09:06     Impressions    79 y.o.  retired  with past medical history of HFrEF (LVEF 30 to 35% as of 10/29/2021 TTE) secondary to ischemic cardiomyopathy, severe multivessel CAD that is currently medically managed, non-insulin-dependent diabetes mellitus, hypothyroidism, hypertension, and ex smoker quit 10/2021, who presents for follow-up regarding HFimprovedEF management.  She completed cardiac MRI 6/2023 which demonstrated LVEF 52%, with left ventricular hypertrophy, no evidence of scar, infiltration, or thrombus. Moreover there was inferior apical hypokinesis.  Cardiac MRI with regadenoson protocol 7/2023 showed \".. LVEF = 70%.  Nearly transmural subendocardial delayed enhancement involving the mid to apical inferior wall (RCA territory) with associated perfusion defect on stress imaging that indicates superimposed ischemia. Given the transmural scar, this myocardium is unlikely to be viable for revascularization. There is associated mild to moderate hypokinesis of the inferior wall that correlates to  this area. RVEF = 76%. \"    ASSESSMENT / PLAN:    #Chronic HFimprovedEF, secondary to ischemic cardiomyopathy: NYHA class 2 symptoms, stage C, profile A (euvolemic) on exam today    - Continue  sacubitril/valsartan to 97/103 mg bid.  Encouraged to monitor blood pressure, and bring log to subsequent visits with cardiology and primary care  -Continue metoprolol succinate 25 mg p.o. once daily.  -Continue spironolactone 25 mg once daily  -Continue torsemide 20 mg p.o. once daily.  -She has been intolerant of SGLT2 inhibitors  - TTE before next visit, if any concern for RWMA will proceed to ischemic evaluation.    #CAD  Severe mid and distal RCA lesions on 11/22/2021 left heart cath, not intervened upon due to small caliber vessel and unclear benefit without viability study. For that reason, intervention was not performed during hospital admission. " Asymptomatic for angina/chest pain  Nonviable myocardium on cardiac MRI  -Continue aspirin 81 mg p.o. once daily.  -Continue  Pravastatin, did not tolerate Atorva    This note was transcribed using the Dragon Dictation system. There may be grammatical, punctuation, or verbiage errors that can occur with voice recognition programs.              Daksha Henderson MD PhD

## 2025-05-05 NOTE — PATIENT INSTRUCTIONS
Thank you for coming in today. If you have any questions or concerns, you may call the Heart Failure Office at [phone] option [phone], or [phone].  You may also contact our heart failure nursing team via email on [email or web address].    For quicker results set-up your  [employer] account to receive results and other correspondence directly to your phone.    Please bring all your pills/medications to your Cardiology appointments.    **  - Keep monitoring your blood pressure at home and bring your log to next visits    - Please be more physically active    - No new medication changes today    -We will renew your heart failure medications today    - Please have the following tests done:  1.Blood tests TODAY (RFP, BNP, CBC)    2.  Echocardiogram, we can schedule this for you today before you leave, or you may CALL  [phone]   OR    [phone] to schedule      - Please make an appointment to be seen in  9 months

## 2025-05-06 LAB
ALBUMIN SERPL-MCNC: 4.2 G/DL (ref 3.6–5.1)
BASOPHILS # BLD AUTO: 59 CELLS/UL (ref 0–200)
BASOPHILS NFR BLD AUTO: 1.3 %
BNP SERPL-MCNC: 115 PG/ML
BUN SERPL-MCNC: 14 MG/DL (ref 7–25)
BUN/CREAT SERPL: 13 (CALC) (ref 6–22)
CALCIUM SERPL-MCNC: 9.8 MG/DL (ref 8.6–10.4)
CHLORIDE SERPL-SCNC: 101 MMOL/L (ref 98–110)
CO2 SERPL-SCNC: 29 MMOL/L (ref 20–32)
CREAT SERPL-MCNC: 1.09 MG/DL (ref 0.6–0.95)
EGFRCR SERPLBLD CKD-EPI 2021: 51 ML/MIN/1.73M2
EOSINOPHIL # BLD AUTO: 41 CELLS/UL (ref 15–500)
EOSINOPHIL NFR BLD AUTO: 0.9 %
ERYTHROCYTE [DISTWIDTH] IN BLOOD BY AUTOMATED COUNT: 15.8 % (ref 11–15)
GLUCOSE SERPL-MCNC: 248 MG/DL (ref 65–139)
HCT VFR BLD AUTO: 37.5 % (ref 35–45)
HGB BLD-MCNC: 10.2 G/DL (ref 11.7–15.5)
LYMPHOCYTES # BLD AUTO: 1274 CELLS/UL (ref 850–3900)
LYMPHOCYTES NFR BLD AUTO: 28.3 %
MCH RBC QN AUTO: 18.3 PG (ref 27–33)
MCHC RBC AUTO-ENTMCNC: 27.2 G/DL (ref 32–36)
MCV RBC AUTO: 67.3 FL (ref 80–100)
MONOCYTES # BLD AUTO: 423 CELLS/UL (ref 200–950)
MONOCYTES NFR BLD AUTO: 9.4 %
MORPHOLOGY BLD-IMP: ABNORMAL
NEUTROPHILS # BLD AUTO: 2705 CELLS/UL (ref 1500–7800)
NEUTROPHILS NFR BLD AUTO: 60.1 %
PHOSPHATE SERPL-MCNC: 4.1 MG/DL (ref 2.1–4.3)
PLATELET # BLD AUTO: 273 THOUSAND/UL (ref 140–400)
PMV BLD REES-ECKER: 11 FL (ref 7.5–12.5)
POTASSIUM SERPL-SCNC: 4.5 MMOL/L (ref 3.5–5.3)
RBC # BLD AUTO: 5.57 MILLION/UL (ref 3.8–5.1)
SODIUM SERPL-SCNC: 139 MMOL/L (ref 135–146)
WBC # BLD AUTO: 4.5 THOUSAND/UL (ref 3.8–10.8)

## 2025-05-09 ENCOUNTER — APPOINTMENT (OUTPATIENT)
Dept: PRIMARY CARE | Facility: CLINIC | Age: 80
End: 2025-05-09
Payer: MEDICARE

## 2025-05-17 DIAGNOSIS — E03.9 HYPOTHYROIDISM, UNSPECIFIED TYPE: ICD-10-CM

## 2025-05-19 RX ORDER — LEVOTHYROXINE SODIUM 100 UG/1
TABLET ORAL
Qty: 90 TABLET | Refills: 0 | Status: SHIPPED | OUTPATIENT
Start: 2025-05-19

## 2025-05-21 ENCOUNTER — APPOINTMENT (OUTPATIENT)
Dept: PRIMARY CARE | Facility: CLINIC | Age: 80
End: 2025-05-21
Payer: MEDICARE

## 2025-05-21 VITALS — HEART RATE: 89 BPM | BODY MASS INDEX: 22.32 KG/M2 | HEIGHT: 63 IN | WEIGHT: 126 LBS

## 2025-05-21 DIAGNOSIS — D56.1 BETA THALASSEMIA (MULTI): ICD-10-CM

## 2025-05-21 DIAGNOSIS — E55.9 VITAMIN D DEFICIENCY: Primary | ICD-10-CM

## 2025-05-21 DIAGNOSIS — E03.9 HYPOTHYROIDISM, UNSPECIFIED TYPE: ICD-10-CM

## 2025-05-21 DIAGNOSIS — E78.5 HYPERLIPIDEMIA, UNSPECIFIED HYPERLIPIDEMIA TYPE: ICD-10-CM

## 2025-05-21 DIAGNOSIS — I10 PRIMARY HYPERTENSION: ICD-10-CM

## 2025-05-21 DIAGNOSIS — E11.9 TYPE 2 DIABETES MELLITUS WITHOUT COMPLICATION, UNSPECIFIED WHETHER LONG TERM INSULIN USE: ICD-10-CM

## 2025-05-21 DIAGNOSIS — N18.30 STAGE 3 CHRONIC KIDNEY DISEASE, UNSPECIFIED WHETHER STAGE 3A OR 3B CKD (MULTI): ICD-10-CM

## 2025-05-21 DIAGNOSIS — Z78.0 POST-MENOPAUSAL: ICD-10-CM

## 2025-05-21 PROCEDURE — 1036F TOBACCO NON-USER: CPT | Performed by: INTERNAL MEDICINE

## 2025-05-21 PROCEDURE — G2211 COMPLEX E/M VISIT ADD ON: HCPCS | Performed by: INTERNAL MEDICINE

## 2025-05-21 PROCEDURE — 99214 OFFICE O/P EST MOD 30 MIN: CPT | Performed by: INTERNAL MEDICINE

## 2025-05-21 RX ORDER — PRAVASTATIN SODIUM 40 MG/1
40 TABLET ORAL NIGHTLY
Qty: 90 TABLET | Refills: 2 | Status: SHIPPED | OUTPATIENT
Start: 2025-05-21 | End: 2026-02-15

## 2025-05-21 ASSESSMENT — ENCOUNTER SYMPTOMS
CONSTITUTIONAL NEGATIVE: 1
CARDIOVASCULAR NEGATIVE: 1
RESPIRATORY NEGATIVE: 1
GASTROINTESTINAL NEGATIVE: 1

## 2025-05-21 ASSESSMENT — LIFESTYLE VARIABLES
HOW MANY STANDARD DRINKS CONTAINING ALCOHOL DO YOU HAVE ON A TYPICAL DAY: PATIENT DOES NOT DRINK
HOW OFTEN DO YOU HAVE SIX OR MORE DRINKS ON ONE OCCASION: NEVER
AUDIT-C TOTAL SCORE: 0
HOW OFTEN DO YOU HAVE A DRINK CONTAINING ALCOHOL: NEVER
SKIP TO QUESTIONS 9-10: 1

## 2025-05-21 NOTE — PROGRESS NOTES
Chief Complaint:   Chief Complaint   Patient presents with    Follow-up        Dana Corrigan is a 80 y.o. year old female is here for follow-up.   HPI   Dana Corrigan is here for follow up on chronic conditions.  Reports no new issues.  Compliant with medications.  Denies side effects.  Needs refills on medications.  Chart reviewed, pharmacy updated, prior notes, labs, imaging, EKGs reviewed.    Past Medical History   Medical History[1]   Past Surgical History:   Surgical History[2]  Family History:   Family History[3]  Social History:   Tobacco Use: Medium Risk (5/21/2025)    Patient History     Smoking Tobacco Use: Former     Smokeless Tobacco Use: Never     Passive Exposure: Past      Social History     Substance and Sexual Activity   Alcohol Use Not Currently        Allergies:   RX Allergies[4]     ROS   Review of Systems   Constitutional: Negative.    Respiratory: Negative.     Cardiovascular: Negative.    Gastrointestinal: Negative.         Objective   Vitals:    05/21/25 1336   Pulse: 89      BMI Readings from Last 15 Encounters:   05/21/25 22.32 kg/m²   05/05/25 23.03 kg/m²   01/13/25 23.03 kg/m²   09/23/24 22.50 kg/m²   06/03/24 21.61 kg/m²   05/15/24 21.97 kg/m²   05/07/24 21.97 kg/m²   04/24/24 21.67 kg/m²   03/26/24 21.08 kg/m²   02/23/24 21.08 kg/m²   02/12/24 21.08 kg/m²   11/02/23 21.08 kg/m²   10/30/23 21.08 kg/m²   07/31/23 21.26 kg/m²   07/11/23 20.73 kg/m²      57.2 kg (126 lb) (5/21/2025  1:36 PM)      Physical Exam  Physical Exam  Neurological:      Mental Status: She is alert.   Psychiatric:         Mood and Affect: Mood normal.          Labs:  CBC:  Recent Labs     05/05/25  1226 01/13/25  1202 06/03/24  1131   WBC 4.5 4.8 5.5   HGB 10.2* 10.2* 10.0*   HCT 37.5 35.0* 33.5*    295 245   MCV 67.3* 65* 63*     CMP:  Recent Labs     05/05/25  1226 01/13/25  1202 06/03/24  1131 02/12/24  1140    138 137 138   K 4.5 4.2 4.6 4.7    98 104 101   CO2 29 31 26 27   ANIONGAP  --  13 12  "15   BUN 14 21 15 13   CREATININE 1.09* 1.20* 0.97 1.16*   EGFR 51* 46* 60* 48*   GLUCOSE 248* 383* 250* 276*     Recent Labs     05/05/25  1226 01/13/25  1202 06/03/24  1131 02/12/24  1140 10/30/23  1029 07/11/23  1327 03/29/23  1227   ALBUMIN 4.2 4.2 4.4   < > 4.5   < > 4.3   ALKPHOS  --  62  --   --  77  --  73   ALT  --  6*  --   --  8  --  10   AST  --  12  --   --  13  --  12   BILITOT  --  0.7  --   --  0.8  --  0.9    < > = values in this interval not displayed.     Calcium/Phos:   Lab Results   Component Value Date    CALCIUM 9.8 05/05/2025    PHOS 4.1 05/05/2025      COAG:   Recent Labs     11/02/21  0524   INR 1.1     CRP: No results found for: \"CRP\"   [unfilled]   ENDO:  Recent Labs     01/13/25  1202 09/23/24  1047 06/03/24  1131 06/03/24  1129 02/12/24  1140 10/30/23  1029 07/31/23  1151   TSH 63.57*  --  2.10  --   --  3.37 0.18*   HGBA1C  --  7.2*  --  8.5* 8.9* 8.8* 10.0*      CARDIAC:   Recent Labs     05/05/25  1226 06/03/24  1131 02/12/24  1140   * 86 67     Recent Labs     01/13/25  1202 10/30/23  1029 07/11/23  1327 03/29/23  1227 11/21/22  1440 03/30/22  1328   CHOL 222* 221* 164 136   < > 218*   LDLF  --   --  78 54  --  118*   LDLCALC  --  128*  --   --   --   --    HDL 76.5 58.1 49.5 47.1   < > 54.0   TRIG  --  177* 183* 173*  --  231*    < > = values in this interval not displayed.     No data recorded    Current Medications:  Current Medications[5]    Assessment and Plan  Dana \"Lisset Corrigan\" was seen today for follow-up.  Diagnoses and all orders for this visit:  Vitamin D deficiency (Primary)  Hyperlipidemia, unspecified hyperlipidemia type  -     pravastatin (Pravachol) 40 mg tablet; Take 1 tablet (40 mg) by mouth once daily at bedtime.  Type 2 diabetes mellitus without complication, unspecified whether long term insulin use  -     Hemoglobin A1c; Future  -     Albumin-Creatinine Ratio, Urine Random; Future  -     Hemoglobin A1c  -     Albumin-Creatinine Ratio, Urine " Random  Primary hypertension  Hypothyroidism, unspecified type  -     Tsh With Reflex To Free T4 If Abnormal; Future  -     Tsh With Reflex To Free T4 If Abnormal  Stage 3 chronic kidney disease, unspecified whether stage 3a or 3b CKD (Multi)  -     XR DEXA bone density; Future  Post-menopausal  -     XR DEXA bone density; Future     HTN, poorly controlled  Could be white coat HTN, as she says that her BP is usually good at home  Recommend ambulatory BP monitoring, order sent to the company  Further med adjustments may be needed if BP is high at home.    Hypothyroidism, the dose of levothyroxine was recently increased to 100 mcg, recheck today  There was a recall on levothyroxine tablets, I wonder if that was the issue, there are no signs of clinical hypothyroidism    HLD, the patient used to be on Pravastatin 20 mg, susbeqently went to 40 mg, but she had to change pharmacies recently and her Pravastatin was never sent to her. Her latest lipids showed significantly elevated cholesterol. We will restart Pravastatin 40 mg daily and will repeat lipids in August.    Due for DEXA    By optimizing your health through good nutrition, daily exercise, stress management, low/moderate alcohol and avoidance of tobacco, sugar and processed foods, you can help to decrease your risk of cardiovascular disease and stroke and achieve a healthy weight. A diet rich in whole, plant-based foods such as vegetables, beans, seeds, nuts, whole grains, healthy fats and fruit - leads to lower body mass index, blood pressure, HbA1C, and cholesterol levels.         Immunizations:  Immunization History   Administered Date(s) Administered    Td vaccine, age 7 years and older (TDVAX) 02/11/2004                [1]   Past Medical History:  Diagnosis Date    Body mass index (BMI) 19.9 or less, adult     BMI less than 19,adult    Body mass index (BMI) 20.0-20.9, adult 04/15/2022    BMI 20.0-20.9, adult    Encounter for screening mammogram for malignant  neoplasm of breast     Visit for screening mammogram   [2]   Past Surgical History:  Procedure Laterality Date    COLONOSCOPY  02/22/2013    Complete Colonoscopy    TOTAL VAGINAL HYSTERECTOMY  06/16/2013    Vaginal Hysterectomy   [3]   Family History  Problem Relation Name Age of Onset    Hypertension Mother      Sarcoidosis Sister      Breast cancer Other      Colon cancer Other      Diabetes Other     [4]   Allergies  Allergen Reactions    Jardiance [Empagliflozin] Itching   [5]   Current Outpatient Medications   Medication Sig Dispense Refill    alendronate (Fosamax) 70 mg tablet Take 1 tablet (70 mg) by mouth every 7 days. Take in the morning with a full glass of water, on an empty stomach, and do not take anything else by mouth or lie down for the next 30 min. 12 tablet 1    aspirin 81 mg EC tablet Take 1 tablet (81 mg) by mouth once daily. 90 tablet 0    cholecalciferol (Vitamin D-3) 50 MCG (2000 UT) tablet Take 1 tablet (2,000 Units) by mouth once daily. 90 tablet 1    levothyroxine (Synthroid, Levoxyl) 100 mcg tablet TAKE 1 TABLET BY MOUTH EVERY MORNING ON EMPTY STOMACH AT THE SAME TIME EACH DAILY 90 tablet 0    metFORMIN  mg 24 hr tablet Take 2 tablets (1,000 mg) by mouth once daily in the evening. Take with meals. 90 tablet 1    metoprolol succinate XL (Toprol-XL) 25 mg 24 hr tablet Take 1 tablet (25 mg) by mouth once daily. Do not crush or chew. 90 tablet 3    sacubitriL-valsartan (Entresto)  mg tablet Take 1 tablet by mouth 2 times a day. 180 tablet 3    spironolactone (Aldactone) 25 mg tablet Take 1 tablet (25 mg) by mouth once daily. 90 tablet 3    torsemide (Demadex) 20 mg tablet Take 1 tablet (20 mg) by mouth once daily as needed (for weight gain or swelling). Take as needed for swelling 90 tablet 1    albuterol (Ventolin HFA) 90 mcg/actuation inhaler Inhale 2 puffs every 4 hours if needed for wheezing or shortness of breath. 18 g 5    blood-glucose meter (OneTouch Ultra2 Meter) misc 1  Units 3 times a day. 1 each 0    lancets (OneTouch UltraSoft 2 Lancet) 30 gauge misc 1 Lancet 3 times a day. 30 each 2    OneTouch Ultra Test strip 1 strip 3 times a day. 100 strip 2    pravastatin (Pravachol) 40 mg tablet Take 1 tablet (40 mg) by mouth once daily at bedtime. 90 tablet 2    tiotropium-olodateroL (Stiolto Respimat) 2.5-2.5 mcg/actuation mist inhaler Inhale 2 Inhalations once daily. 4 g 5     No current facility-administered medications for this visit.

## 2025-05-22 LAB
ALBUMIN/CREAT UR: 60 MG/G CREAT
CREAT UR-MCNC: 115 MG/DL (ref 20–275)
EST. AVERAGE GLUCOSE BLD GHB EST-MCNC: 232 MG/DL
EST. AVERAGE GLUCOSE BLD GHB EST-SCNC: 12.8 MMOL/L
HBA1C MFR BLD: 9.7 %
MICROALBUMIN UR-MCNC: 6.9 MG/DL
T4 FREE SERPL-MCNC: 1.4 NG/DL (ref 0.8–1.8)
TSH SERPL-ACNC: 8.56 MIU/L (ref 0.4–4.5)

## 2025-05-28 ENCOUNTER — ANCILLARY PROCEDURE (OUTPATIENT)
Facility: CLINIC | Age: 80
End: 2025-05-28
Payer: MEDICARE

## 2025-05-28 DIAGNOSIS — I50.20 HFREF (HEART FAILURE WITH REDUCED EJECTION FRACTION): ICD-10-CM

## 2025-05-28 DIAGNOSIS — I50.32 HEART FAILURE WITH IMPROVED EJECTION FRACTION (HFIMPEF): ICD-10-CM

## 2025-05-28 LAB
AORTIC VALVE PEAK VELOCITY: 1.01 M/S
AV PEAK GRADIENT: 4 MMHG
AVA (PEAK VEL): 2.1 CM2
EJECTION FRACTION APICAL 4 CHAMBER: 54.8
EJECTION FRACTION: 61 %
LEFT ATRIUM VOLUME AREA LENGTH INDEX BSA: 22.7 ML/M2
LEFT VENTRICLE INTERNAL DIMENSION DIASTOLE: 3.71 CM (ref 3.5–6)
LEFT VENTRICULAR OUTFLOW TRACT DIAMETER: 1.82 CM
MITRAL VALVE E/A RATIO: 0.48
RIGHT VENTRICLE FREE WALL PEAK S': 9 CM/S
RIGHT VENTRICLE PEAK SYSTOLIC PRESSURE: 32 MMHG
TRICUSPID ANNULAR PLANE SYSTOLIC EXCURSION: 1.6 CM

## 2025-05-28 PROCEDURE — 93306 TTE W/DOPPLER COMPLETE: CPT

## 2025-05-28 PROCEDURE — 93306 TTE W/DOPPLER COMPLETE: CPT | Performed by: STUDENT IN AN ORGANIZED HEALTH CARE EDUCATION/TRAINING PROGRAM

## 2025-06-04 DIAGNOSIS — E11.9 TYPE 2 DIABETES MELLITUS WITHOUT COMPLICATION, WITHOUT LONG-TERM CURRENT USE OF INSULIN: ICD-10-CM

## 2025-06-04 RX ORDER — METFORMIN HYDROCHLORIDE 500 MG/1
1000 TABLET, EXTENDED RELEASE ORAL
Qty: 90 TABLET | Refills: 1 | Status: SHIPPED | OUTPATIENT
Start: 2025-06-04

## 2025-06-09 DIAGNOSIS — R06.2 WHEEZING: ICD-10-CM

## 2025-06-09 RX ORDER — TIOTROPIUM BROMIDE AND OLODATEROL 3.124; 2.736 UG/1; UG/1
2 SPRAY, METERED RESPIRATORY (INHALATION) DAILY
Qty: 4 G | Refills: 5 | Status: SHIPPED | OUTPATIENT
Start: 2025-06-09

## 2025-06-12 DIAGNOSIS — R06.2 WHEEZING: ICD-10-CM

## 2025-06-12 RX ORDER — ALBUTEROL SULFATE 90 UG/1
INHALANT RESPIRATORY (INHALATION)
Qty: 18 G | Refills: 5 | Status: SHIPPED | OUTPATIENT
Start: 2025-06-12

## 2025-07-28 DIAGNOSIS — I50.20 HFREF (HEART FAILURE WITH REDUCED EJECTION FRACTION): ICD-10-CM

## 2025-07-28 RX ORDER — SACUBITRIL AND VALSARTAN 97; 103 MG/1; MG/1
1 TABLET, FILM COATED ORAL
Qty: 180 TABLET | Refills: 3 | Status: SHIPPED | OUTPATIENT
Start: 2025-07-28

## 2025-08-19 ENCOUNTER — APPOINTMENT (OUTPATIENT)
Dept: OPHTHALMOLOGY | Facility: CLINIC | Age: 80
End: 2025-08-19
Payer: MEDICARE

## 2025-08-19 DIAGNOSIS — H52.03 HYPEROPIA OF BOTH EYES: ICD-10-CM

## 2025-08-19 DIAGNOSIS — E11.9 TYPE 2 DIABETES MELLITUS WITHOUT COMPLICATION, WITHOUT LONG-TERM CURRENT USE OF INSULIN: Primary | ICD-10-CM

## 2025-08-19 DIAGNOSIS — H52.223 REGULAR ASTIGMATISM OF BOTH EYES: ICD-10-CM

## 2025-08-19 DIAGNOSIS — H52.4 PRESBYOPIA: ICD-10-CM

## 2025-08-19 DIAGNOSIS — H53.8 BLURRED VISION: ICD-10-CM

## 2025-08-19 DIAGNOSIS — H04.123 DRY EYES: ICD-10-CM

## 2025-08-19 DIAGNOSIS — H02.401 PTOSIS OF RIGHT EYELID: ICD-10-CM

## 2025-08-19 PROCEDURE — 92014 COMPRE OPH EXAM EST PT 1/>: CPT | Performed by: OPHTHALMOLOGY

## 2025-08-19 PROCEDURE — 92015 DETERMINE REFRACTIVE STATE: CPT | Performed by: OPHTHALMOLOGY

## 2025-08-19 ASSESSMENT — REFRACTION_MANIFEST
OD_ADD: +2.75
OS_SPHERE: +0.00
OD_CYLINDER: -1.00
OS_CYLINDER: -0.75
OD_AXIS: 100
OD_SPHERE: +0.00
OS_AXIS: 100
OS_ADD: +2.75

## 2025-08-19 ASSESSMENT — SLIT LAMP EXAM - LIDS
COMMENTS: 2+ PTOSIS
COMMENTS: NORMAL

## 2025-08-19 ASSESSMENT — REFRACTION_WEARINGRX
OD_ADD: +2.75
OD_SPHERE: +0.25
OS_ADD: +2.75
OS_AXIS: 100
OD_CYLINDER: -1.00
OD_AXIS: 100
OS_CYLINDER: -1.00
OS_SPHERE: +0.25

## 2025-08-19 ASSESSMENT — EXTERNAL EXAM - RIGHT EYE: OD_EXAM: NORMAL

## 2025-08-19 ASSESSMENT — TONOMETRY
IOP_METHOD: GOLDMANN APPLANATION
OS_IOP_MMHG: 28
OD_IOP_MMHG: 20

## 2025-08-19 ASSESSMENT — CUP TO DISC RATIO
OD_RATIO: 0.3
OS_RATIO: 0.3

## 2025-08-19 ASSESSMENT — ENCOUNTER SYMPTOMS: EYES NEGATIVE: 1

## 2025-08-19 ASSESSMENT — VISUAL ACUITY
METHOD: SNELLEN - LINEAR
OD_CC: 20/25-2
OS_CC: 20/30-1

## 2025-08-19 ASSESSMENT — EXTERNAL EXAM - LEFT EYE: OS_EXAM: NORMAL

## 2025-08-20 ENCOUNTER — APPOINTMENT (OUTPATIENT)
Dept: PRIMARY CARE | Facility: CLINIC | Age: 80
End: 2025-08-20
Payer: MEDICARE

## 2025-08-20 VITALS
DIASTOLIC BLOOD PRESSURE: 78 MMHG | HEART RATE: 87 BPM | WEIGHT: 123 LBS | HEIGHT: 63 IN | BODY MASS INDEX: 21.79 KG/M2 | SYSTOLIC BLOOD PRESSURE: 155 MMHG

## 2025-08-20 DIAGNOSIS — E78.5 HYPERLIPIDEMIA, UNSPECIFIED HYPERLIPIDEMIA TYPE: ICD-10-CM

## 2025-08-20 DIAGNOSIS — E11.9 TYPE 2 DIABETES MELLITUS WITHOUT COMPLICATION, UNSPECIFIED WHETHER LONG TERM INSULIN USE: Primary | ICD-10-CM

## 2025-08-20 DIAGNOSIS — I10 PRIMARY HYPERTENSION: ICD-10-CM

## 2025-08-20 DIAGNOSIS — E11.9 TYPE 2 DIABETES MELLITUS WITHOUT COMPLICATION, WITHOUT LONG-TERM CURRENT USE OF INSULIN: ICD-10-CM

## 2025-08-20 DIAGNOSIS — R06.2 WHEEZING: ICD-10-CM

## 2025-08-20 DIAGNOSIS — E03.9 HYPOTHYROIDISM, UNSPECIFIED TYPE: ICD-10-CM

## 2025-08-20 DIAGNOSIS — N18.30 STAGE 3 CHRONIC KIDNEY DISEASE, UNSPECIFIED WHETHER STAGE 3A OR 3B CKD (MULTI): ICD-10-CM

## 2025-08-20 PROCEDURE — 3078F DIAST BP <80 MM HG: CPT | Performed by: INTERNAL MEDICINE

## 2025-08-20 PROCEDURE — 3077F SYST BP >= 140 MM HG: CPT | Performed by: INTERNAL MEDICINE

## 2025-08-20 PROCEDURE — 99214 OFFICE O/P EST MOD 30 MIN: CPT | Performed by: INTERNAL MEDICINE

## 2025-08-20 PROCEDURE — G2211 COMPLEX E/M VISIT ADD ON: HCPCS | Performed by: INTERNAL MEDICINE

## 2025-08-20 RX ORDER — LEVOTHYROXINE SODIUM 100 UG/1
100 TABLET ORAL DAILY
Qty: 90 TABLET | Refills: 0 | Status: SHIPPED | OUTPATIENT
Start: 2025-08-20

## 2025-08-20 RX ORDER — ALBUTEROL SULFATE 90 UG/1
2 INHALANT RESPIRATORY (INHALATION) EVERY 6 HOURS PRN
Qty: 18 G | Refills: 5 | Status: SHIPPED | OUTPATIENT
Start: 2025-08-20

## 2025-08-20 RX ORDER — METFORMIN HYDROCHLORIDE 500 MG/1
1000 TABLET, EXTENDED RELEASE ORAL
Qty: 90 TABLET | Refills: 1 | Status: SHIPPED | OUTPATIENT
Start: 2025-08-20

## 2025-08-20 ASSESSMENT — ENCOUNTER SYMPTOMS
UNEXPECTED WEIGHT CHANGE: 1
FATIGUE: 1
SHORTNESS OF BREATH: 1
APPETITE CHANGE: 1

## 2025-08-20 ASSESSMENT — LIFESTYLE VARIABLES
HOW MANY STANDARD DRINKS CONTAINING ALCOHOL DO YOU HAVE ON A TYPICAL DAY: PATIENT DOES NOT DRINK
SKIP TO QUESTIONS 9-10: 1
AUDIT-C TOTAL SCORE: 0
HOW OFTEN DO YOU HAVE A DRINK CONTAINING ALCOHOL: NEVER
HOW OFTEN DO YOU HAVE SIX OR MORE DRINKS ON ONE OCCASION: NEVER

## 2025-08-20 ASSESSMENT — PATIENT HEALTH QUESTIONNAIRE - PHQ9
1. LITTLE INTEREST OR PLEASURE IN DOING THINGS: NOT AT ALL
SUM OF ALL RESPONSES TO PHQ9 QUESTIONS 1 AND 2: 0
2. FEELING DOWN, DEPRESSED OR HOPELESS: NOT AT ALL

## 2025-08-21 LAB
ALBUMIN SERPL-MCNC: 4.3 G/DL (ref 3.6–5.1)
ALP SERPL-CCNC: 65 U/L (ref 37–153)
ALT SERPL-CCNC: 7 U/L (ref 6–29)
ANION GAP SERPL CALCULATED.4IONS-SCNC: 9 MMOL/L (CALC) (ref 7–17)
AST SERPL-CCNC: 11 U/L (ref 10–35)
BILIRUB SERPL-MCNC: 0.8 MG/DL (ref 0.2–1.2)
BUN SERPL-MCNC: 29 MG/DL (ref 7–25)
CALCIUM SERPL-MCNC: 9.6 MG/DL (ref 8.6–10.4)
CHLORIDE SERPL-SCNC: 98 MMOL/L (ref 98–110)
CHOLEST SERPL-MCNC: 166 MG/DL
CHOLEST/HDLC SERPL: 2.9 (CALC)
CO2 SERPL-SCNC: 30 MMOL/L (ref 20–32)
CREAT SERPL-MCNC: 1.34 MG/DL (ref 0.6–0.95)
EGFRCR SERPLBLD CKD-EPI 2021: 40 ML/MIN/1.73M2
EST. AVERAGE GLUCOSE BLD GHB EST-MCNC: 232 MG/DL
EST. AVERAGE GLUCOSE BLD GHB EST-SCNC: 12.8 MMOL/L
GLUCOSE SERPL-MCNC: 334 MG/DL (ref 65–139)
HBA1C MFR BLD: 9.7 %
HDLC SERPL-MCNC: 58 MG/DL
LDLC SERPL CALC-MCNC: 86 MG/DL (CALC)
NONHDLC SERPL-MCNC: 108 MG/DL (CALC)
POTASSIUM SERPL-SCNC: 4.5 MMOL/L (ref 3.5–5.3)
PROT SERPL-MCNC: 7.4 G/DL (ref 6.1–8.1)
SODIUM SERPL-SCNC: 137 MMOL/L (ref 135–146)
TRIGL SERPL-MCNC: 127 MG/DL
TSH SERPL-ACNC: 0.48 MIU/L (ref 0.4–4.5)

## 2025-09-30 ENCOUNTER — APPOINTMENT (OUTPATIENT)
Dept: OPHTHALMOLOGY | Facility: CLINIC | Age: 80
End: 2025-09-30
Payer: MEDICARE

## 2025-11-24 ENCOUNTER — APPOINTMENT (OUTPATIENT)
Dept: PRIMARY CARE | Facility: CLINIC | Age: 80
End: 2025-11-24
Payer: MEDICARE

## 2026-01-02 ENCOUNTER — APPOINTMENT (OUTPATIENT)
Dept: OPHTHALMOLOGY | Facility: CLINIC | Age: 81
End: 2026-01-02
Payer: MEDICARE

## 2026-02-10 ENCOUNTER — APPOINTMENT (OUTPATIENT)
Facility: CLINIC | Age: 81
End: 2026-02-10
Payer: MEDICARE